# Patient Record
Sex: FEMALE | Race: BLACK OR AFRICAN AMERICAN | NOT HISPANIC OR LATINO | Employment: STUDENT | URBAN - METROPOLITAN AREA
[De-identification: names, ages, dates, MRNs, and addresses within clinical notes are randomized per-mention and may not be internally consistent; named-entity substitution may affect disease eponyms.]

---

## 2017-07-19 ENCOUNTER — HOSPITAL ENCOUNTER (EMERGENCY)
Facility: CLINIC | Age: 32
Discharge: HOME | End: 2017-07-19
Attending: EMERGENCY MEDICINE | Admitting: EMERGENCY MEDICINE
Payer: COMMERCIAL

## 2017-07-19 VITALS
BODY MASS INDEX: 34.89 KG/M2 | HEIGHT: 62 IN | OXYGEN SATURATION: 99 % | TEMPERATURE: 98.2 F | WEIGHT: 189.6 LBS | DIASTOLIC BLOOD PRESSURE: 84 MMHG | HEART RATE: 80 BPM | RESPIRATION RATE: 18 BRPM | SYSTOLIC BLOOD PRESSURE: 123 MMHG

## 2017-07-19 DIAGNOSIS — E86.0 DEHYDRATION: ICD-10-CM

## 2017-07-19 DIAGNOSIS — M54.50 ACUTE BILATERAL LOW BACK PAIN WITHOUT SCIATICA: ICD-10-CM

## 2017-07-19 LAB
ALBUMIN SERPL-MCNC: 3.8 G/DL (ref 3.4–5)
ALBUMIN UR-MCNC: NEGATIVE MG/DL
ALP SERPL-CCNC: 65 U/L (ref 40–150)
ALT SERPL W P-5'-P-CCNC: 21 U/L (ref 0–50)
ANION GAP SERPL CALCULATED.3IONS-SCNC: 9 MMOL/L (ref 3–14)
APPEARANCE UR: ABNORMAL
AST SERPL W P-5'-P-CCNC: 22 U/L (ref 0–45)
BASOPHILS # BLD AUTO: 0 10E9/L (ref 0–0.2)
BASOPHILS NFR BLD AUTO: 0.3 %
BILIRUB SERPL-MCNC: 0.4 MG/DL (ref 0.2–1.3)
BILIRUB UR QL STRIP: NEGATIVE
BUN SERPL-MCNC: 14 MG/DL (ref 7–30)
CALCIUM SERPL-MCNC: 8.8 MG/DL (ref 8.5–10.1)
CHLORIDE SERPL-SCNC: 105 MMOL/L (ref 94–109)
CO2 SERPL-SCNC: 26 MMOL/L (ref 20–32)
COLOR UR AUTO: ABNORMAL
CREAT SERPL-MCNC: 0.67 MG/DL (ref 0.52–1.04)
DIFFERENTIAL METHOD BLD: NORMAL
EOSINOPHIL # BLD AUTO: 0.1 10E9/L (ref 0–0.7)
EOSINOPHIL NFR BLD AUTO: 0.9 %
ERYTHROCYTE [DISTWIDTH] IN BLOOD BY AUTOMATED COUNT: 12.6 % (ref 10–15)
GFR SERPL CREATININE-BSD FRML MDRD: NORMAL ML/MIN/1.7M2
GLUCOSE SERPL-MCNC: 79 MG/DL (ref 70–99)
GLUCOSE UR STRIP-MCNC: NEGATIVE MG/DL
HCT VFR BLD AUTO: 37 % (ref 35–47)
HGB BLD-MCNC: 12.2 G/DL (ref 11.7–15.7)
HGB UR QL STRIP: NEGATIVE
IMM GRANULOCYTES # BLD: 0 10E9/L (ref 0–0.4)
IMM GRANULOCYTES NFR BLD: 0.2 %
KETONES UR STRIP-MCNC: >150 MG/DL
LEUKOCYTE ESTERASE UR QL STRIP: NEGATIVE
LYMPHOCYTES # BLD AUTO: 3.1 10E9/L (ref 0.8–5.3)
LYMPHOCYTES NFR BLD AUTO: 34.6 %
MCH RBC QN AUTO: 29.8 PG (ref 26.5–33)
MCHC RBC AUTO-ENTMCNC: 33 G/DL (ref 31.5–36.5)
MCV RBC AUTO: 91 FL (ref 78–100)
MONOCYTES # BLD AUTO: 0.6 10E9/L (ref 0–1.3)
MONOCYTES NFR BLD AUTO: 6.2 %
MUCOUS THREADS #/AREA URNS LPF: PRESENT /LPF
NEUTROPHILS # BLD AUTO: 5.1 10E9/L (ref 1.6–8.3)
NEUTROPHILS NFR BLD AUTO: 57.8 %
NITRATE UR QL: NEGATIVE
NRBC # BLD AUTO: 0 10*3/UL
NRBC BLD AUTO-RTO: 0 /100
PH UR STRIP: 6 PH (ref 5–7)
PLATELET # BLD AUTO: 259 10E9/L (ref 150–450)
POTASSIUM SERPL-SCNC: 3.7 MMOL/L (ref 3.4–5.3)
PROT SERPL-MCNC: 7.4 G/DL (ref 6.8–8.8)
RBC # BLD AUTO: 4.09 10E12/L (ref 3.8–5.2)
RBC #/AREA URNS AUTO: 0 /HPF (ref 0–2)
SODIUM SERPL-SCNC: 140 MMOL/L (ref 133–144)
SP GR UR STRIP: 1.02 (ref 1–1.03)
SQUAMOUS #/AREA URNS AUTO: 9 /HPF (ref 0–1)
URN SPEC COLLECT METH UR: ABNORMAL
UROBILINOGEN UR STRIP-MCNC: NORMAL MG/DL (ref 0–2)
WBC # BLD AUTO: 8.8 10E9/L (ref 4–11)
WBC #/AREA URNS AUTO: 2 /HPF (ref 0–2)

## 2017-07-19 PROCEDURE — 81001 URINALYSIS AUTO W/SCOPE: CPT | Performed by: EMERGENCY MEDICINE

## 2017-07-19 PROCEDURE — 25000128 H RX IP 250 OP 636: Performed by: EMERGENCY MEDICINE

## 2017-07-19 PROCEDURE — 87086 URINE CULTURE/COLONY COUNT: CPT | Performed by: EMERGENCY MEDICINE

## 2017-07-19 PROCEDURE — 96374 THER/PROPH/DIAG INJ IV PUSH: CPT

## 2017-07-19 PROCEDURE — 99284 EMERGENCY DEPT VISIT MOD MDM: CPT | Mod: 25

## 2017-07-19 PROCEDURE — 85025 COMPLETE CBC W/AUTO DIFF WBC: CPT | Performed by: EMERGENCY MEDICINE

## 2017-07-19 PROCEDURE — 99284 EMERGENCY DEPT VISIT MOD MDM: CPT | Mod: Z6 | Performed by: EMERGENCY MEDICINE

## 2017-07-19 PROCEDURE — 80053 COMPREHEN METABOLIC PANEL: CPT | Performed by: EMERGENCY MEDICINE

## 2017-07-19 RX ORDER — ACETAMINOPHEN AND CODEINE PHOSPHATE 300; 30 MG/1; MG/1
1-2 TABLET ORAL EVERY 6 HOURS PRN
Qty: 20 TABLET | Refills: 0 | Status: SHIPPED | OUTPATIENT
Start: 2017-07-19

## 2017-07-19 RX ORDER — KETOROLAC TROMETHAMINE 30 MG/ML
30 INJECTION, SOLUTION INTRAMUSCULAR; INTRAVENOUS ONCE
Status: COMPLETED | OUTPATIENT
Start: 2017-07-19 | End: 2017-07-19

## 2017-07-19 RX ADMIN — KETOROLAC TROMETHAMINE 30 MG: 30 INJECTION, SOLUTION INTRAMUSCULAR at 19:50

## 2017-07-19 ASSESSMENT — ENCOUNTER SYMPTOMS
ABDOMINAL PAIN: 0
DYSURIA: 0
FREQUENCY: 1
BACK PAIN: 1

## 2017-07-19 NOTE — ED AVS SNAPSHOT
[facility], Emergency Department    [address] [facility] [address]    Phone:  [phone]                                       Keira Montoya   MRN: [id number]    Department:  [facility], Emergency Department   Date of Visit:  7/19/2017           Patient Information     Date Of Birth          1985        Your diagnoses for this visit were:     Acute bilateral low back pain without sciatica     Dehydration        You were seen by Mariam Webster MD.        Discharge Instructions       Please drink plenty of liquids-water and beverages with electrolytes.   Take the tylenol #3 as needed for pain.   Follow up with your primary care doctor for further care.     Discharge References/Attachments     DEHYDRATION (ENGLISH)      24 Hour Appointment Hotline       To make an appointment at any [facility] clinic, call [phone]). If you don't have a family doctor or clinic, we will help you find one. [facility] clinics are conveniently located to serve the needs of you and your family.             Review of your medicines      START taking        Dose / Directions Last dose taken    acetaminophen-codeine 300-30 MG per tablet   Commonly known as:  TYLENOL/codeine #3   Dose:  1-2 tablet   Quantity:  20 tablet        Take 1-2 tablets by mouth every 6 hours as needed for pain   Refills:  0                Prescriptions were sent or printed at these locations (1 Prescription)                   Other Prescriptions                Printed at Department/Unit printer (1 of 1)         acetaminophen-codeine (TYLENOL/CODEINE #3) 300-30 MG per tablet                Procedures and tests performed during your visit     CBC with platelets differential    Comprehensive metabolic panel    UA with Microscopic    Urine Culture      Orders Needing Specimen Collection     None      Pending Results     Date and Time Order Name Status Description    7/19/2017 1929 Urine Culture Preliminary            "  Pending Culture Results     Date and Time Order Name Status Description    2017 1929 Urine Culture Preliminary             Pending Results Instructions     If you had any lab results that were not finalized at the time of your Discharge, you can call the ED Lab Result RN at [phone]. You will be contacted by this team for any positive Lab results or changes in treatment. The nurses are available 7 days a week from 10A to 6:30P.  You can leave a message 24 hours per day and they will return your call.        Thank you for choosing [facility]       Thank you for choosing [facility] for your care. Our goal is always to provide you with excellent care. Hearing back from our patients is one way we can continue to improve our services. Please take a few minutes to complete the written survey that you may receive in the mail after you visit with us. Thank you!        [employer] Information     [employer] lets you send messages to your doctor, view your test results, renew your prescriptions, schedule appointments and more. To sign up, go to [email or web address] . Click on \"Log in\" on the left side of the screen, which will take you to the Welcome page. Then click on \"Sign up Now\" on the right side of the page.     You will be asked to enter the access code listed below, as well as some personal information. Please follow the directions to create your username and password.     Your access code is: [email or web address]  Expires: 10/17/2017  9:29 PM     Your access code will  in 90 days. If you need help or a new code, please call your [facility] clinic or [phone].        Care EveryWhere ID     This is your Care EveryWhere ID. This could be used by other organizations to access your [facility] medical records  [id number]        Equal Access to Services     QIANA KHAN : Afshan Arreguin, dalila brown, katelyn monterroso. So kymberly " [phone].    ATENCIÓN: Si habla español, tiene a phillips disposición servicios gratuitos de asistencia lingüística. Llame al [phone].    We comply with applicable federal civil rights laws and [place] laws. We do not discriminate on the basis of race, color, national origin, age, disability sex, sexual orientation or gender identity.            After Visit Summary       This is your record. Keep this with you and show to your community pharmacist(s) and doctor(s) at your next visit.

## 2017-07-19 NOTE — ED NOTES
Pt presents ambulatory to triage from home with spouse. Pt states began having lower back pain a few weeks ago along with pain with urination. Pt seen by PCP today for issue. Has pelvic and negative urine test for UTI, sent for culture. Pt concerned since pain is getting worse. Denies fevers.

## 2017-07-19 NOTE — ED AVS SNAPSHOT
[facility] [place], Emergency Department    [address] [place] [address]    Phone:  [phone]                                       Keira Montoya   MRN: [id number]    Department:  [facility], Emergency Department   Date of Visit:  7/19/2017           After Visit Summary Signature Page     I have received my discharge instructions, and my questions have been answered. I have discussed any challenges I see with this plan with the nurse or doctor.    ..........................................................................................................................................  Patient/Patient Representative Signature      ..........................................................................................................................................  Patient Representative Print Name and Relationship to Patient    ..................................................               ................................................  Date                                            Time    ..........................................................................................................................................  Reviewed by Signature/Title    ...................................................              ..............................................  Date                                                            Time

## 2017-07-20 LAB
BACTERIA SPEC CULT: NORMAL
Lab: NORMAL
MICRO REPORT STATUS: NORMAL
SPECIMEN SOURCE: NORMAL

## 2017-07-20 NOTE — ED PROVIDER NOTES
"  History     Chief Complaint   Patient presents with     Back Pain     HPI  Keira Montoya is a 31 year old female who presents to the ED with back pain. Patient she has had ongoing low back pain for the past month. She states she did have dysuria, urinary frequency, and vaginal itching, but the dysuria and itching have resolved. She states her low back pain is made worse with standing. She reports she was seen by her PCP today and had a negative UA for UTI and her PCP was concerned for high amounts of protein in her urine. She states she has been on a ketogenic diet for the past 4 months. She otherwise currently denies possibility of pregnancy, vaginal discharge, or  abdominal pain.    PAST MEDICAL HISTORY  History reviewed. No pertinent past medical history.  PAST SURGICAL HISTORY  History reviewed. No pertinent surgical history.  FAMILY HISTORY  No family history on file.  SOCIAL HISTORY  Social History   Substance Use Topics     Smoking status: Never Smoker     Smokeless tobacco: Never Used     Alcohol use No     MEDICATIONS  Current Facility-Administered Medications   Medication     ketorolac (TORADOL) injection 30 mg     No current outpatient prescriptions on file.     ALLERGIES  No Known Allergies    I have reviewed the Medications, Allergies, Past Medical and Surgical History, and Social History in the Epic system.    Review of Systems   Gastrointestinal: Negative for abdominal pain.   Genitourinary: Positive for frequency. Negative for dysuria and vaginal discharge.   Musculoskeletal: Positive for back pain.   All other systems reviewed and are negative.      Physical Exam   BP: 122/83  Pulse: 75  Temp: 98.2  F (36.8  C)  Resp: 16  Height: 157.5 cm (5' 2\")  Weight: 86 kg (189 lb 9.5 oz)  SpO2: 99 %  Physical Exam   Constitutional: She is oriented to person, place, and time. She appears well-developed and well-nourished.   HENT:   Head: Normocephalic and atraumatic.   Neck: Normal range of motion. "   Cardiovascular: Normal rate, regular rhythm and normal heart sounds.    Pulmonary/Chest: Effort normal and breath sounds normal. No respiratory distress.   Abdominal: Soft. She exhibits no distension. There is no tenderness. There is no rebound.   Musculoskeletal: She exhibits tenderness (mild mid lower back pain, no flank pain; negative straight leg raise; able to fully flex and extend back; pain worse  with standing straight up).   Neurological: She is alert and oriented to person, place, and time.   Skin: Skin is warm and dry.   Psychiatric: She has a normal mood and affect. Her behavior is normal. Thought content normal.       ED Course     ED Course     Procedures                Critical Care time:  none         Results for orders placed or performed during the hospital encounter of 07/19/17   CBC with platelets differential   Result Value Ref Range    WBC 8.8 4.0 - 11.0 10e9/L    RBC Count 4.09 3.8 - 5.2 10e12/L    Hemoglobin 12.2 11.7 - 15.7 g/dL    Hematocrit 37.0 35.0 - 47.0 %    MCV 91 78 - 100 fl    MCH 29.8 26.5 - 33.0 pg    MCHC 33.0 31.5 - 36.5 g/dL    RDW 12.6 10.0 - 15.0 %    Platelet Count 259 150 - 450 10e9/L    Diff Method Automated Method     % Neutrophils 57.8 %    % Lymphocytes 34.6 %    % Monocytes 6.2 %    % Eosinophils 0.9 %    % Basophils 0.3 %    % Immature Granulocytes 0.2 %    Nucleated RBCs 0 0 /100    Absolute Neutrophil 5.1 1.6 - 8.3 10e9/L    Absolute Lymphocytes 3.1 0.8 - 5.3 10e9/L    Absolute Monocytes 0.6 0.0 - 1.3 10e9/L    Absolute Eosinophils 0.1 0.0 - 0.7 10e9/L    Absolute Basophils 0.0 0.0 - 0.2 10e9/L    Abs Immature Granulocytes 0.0 0 - 0.4 10e9/L    Absolute Nucleated RBC 0.0    Comprehensive metabolic panel   Result Value Ref Range    Sodium 140 133 - 144 mmol/L    Potassium 3.7 3.4 - 5.3 mmol/L    Chloride 105 94 - 109 mmol/L    Carbon Dioxide 26 20 - 32 mmol/L    Anion Gap 9 3 - 14 mmol/L    Glucose 79 70 - 99 mg/dL    Urea Nitrogen 14 7 - 30 mg/dL    Creatinine  0.67 0.52 - 1.04 mg/dL    GFR Estimate >90  Non  GFR Calc   >60 mL/min/1.7m2    GFR Estimate If Black >90   GFR Calc   >60 mL/min/1.7m2    Calcium 8.8 8.5 - 10.1 mg/dL    Bilirubin Total 0.4 0.2 - 1.3 mg/dL    Albumin 3.8 3.4 - 5.0 g/dL    Protein Total 7.4 6.8 - 8.8 g/dL    Alkaline Phosphatase 65 40 - 150 U/L    ALT 21 0 - 50 U/L    AST 22 0 - 45 U/L   UA with Microscopic   Result Value Ref Range    Color Urine Light Yellow     Appearance Urine Slightly Cloudy     Glucose Urine Negative NEG mg/dL    Bilirubin Urine Negative NEG    Ketones Urine >150 (A) NEG mg/dL    Specific Gravity Urine 1.022 1.003 - 1.035    Blood Urine Negative NEG    pH Urine 6.0 5.0 - 7.0 pH    Protein Albumin Urine Negative NEG mg/dL    Urobilinogen mg/dL Normal 0.0 - 2.0 mg/dL    Nitrite Urine Negative NEG    Leukocyte Esterase Urine Negative NEG    Source Clean catch urine     WBC Urine 2 0 - 2 /HPF    RBC Urine 0 0 - 2 /HPF    Squamous Epithelial /HPF Urine 9 (H) 0 - 1 /HPF    Mucous Urine Present (A) NEG /LPF   Urine Culture   Result Value Ref Range    Specimen Description Unspecified Urine     Special Requests Specimen received in preservative     Culture Micro Pending     Micro Report Status Pending      Medications   ketorolac (TORADOL) injection 30 mg (30 mg Intravenous Given 7/19/17 1950)              Labs Ordered and Resulted from Time of ED Arrival Up to the Time of Departure from the ED - No data to display         Assessments & Plan (with Medical Decision Making) patient presents to the ER to to lower back pain.  Patient's pain is not located at the location of her kidneys.  Obtained a UA that shows positive urine ketones.  Slightly sleepy from patient's high protein no carbohydrate diet.  Patient's blood tests are all normal.  She received 1 dose of Toradol and her back pain has resolved.  Feel a patient's back pain is likely musculoskeletal.  Recommended she drink large amount of liquids and  eat small amount of carbs to prevent ketouria. Pt agrees. Stable for discharge.        I have reviewed the nursing notes.    I have reviewed the findings, diagnosis, plan and need for follow up with the patient.    New Prescriptions    No medications on file       Final diagnoses:   Acute bilateral low back pain without sciatica   Dehydration      ICurry, am serving as a trained medical scribe to document services personally performed by Mariam Webster MD, based on the provider's statements to me.      IMariam MD, was physically present and have reviewed and verified the accuracy of this note documented by Curry Salazar.     7/19/2017   [facility], [place], EMERGENCY DEPARTMENT     Mariam Webster MD  07/20/17 0114

## 2017-07-20 NOTE — DISCHARGE INSTRUCTIONS
Please drink plenty of liquids-water and beverages with electrolytes.   Take the tylenol #3 as needed for pain.   Follow up with your primary care doctor for further care.

## 2017-12-05 ENCOUNTER — APPOINTMENT (OUTPATIENT)
Dept: CT IMAGING | Facility: CLINIC | Age: 32
End: 2017-12-05
Attending: EMERGENCY MEDICINE
Payer: COMMERCIAL

## 2017-12-05 ENCOUNTER — HOSPITAL ENCOUNTER (EMERGENCY)
Facility: CLINIC | Age: 32
Discharge: HOME | End: 2017-12-05
Attending: EMERGENCY MEDICINE | Admitting: EMERGENCY MEDICINE
Payer: COMMERCIAL

## 2017-12-05 ENCOUNTER — APPOINTMENT (OUTPATIENT)
Dept: ULTRASOUND IMAGING | Facility: CLINIC | Age: 32
End: 2017-12-05
Attending: EMERGENCY MEDICINE
Payer: COMMERCIAL

## 2017-12-05 VITALS
TEMPERATURE: 97.1 F | OXYGEN SATURATION: 100 % | HEART RATE: 55 BPM | WEIGHT: 190.8 LBS | DIASTOLIC BLOOD PRESSURE: 76 MMHG | SYSTOLIC BLOOD PRESSURE: 112 MMHG | RESPIRATION RATE: 16 BRPM | BODY MASS INDEX: 34.9 KG/M2

## 2017-12-05 DIAGNOSIS — R10.9 FLANK PAIN: ICD-10-CM

## 2017-12-05 LAB
ALBUMIN UR-MCNC: NEGATIVE MG/DL
ANION GAP SERPL CALCULATED.3IONS-SCNC: 4 MMOL/L (ref 3–14)
APPEARANCE UR: CLEAR
BASOPHILS # BLD AUTO: 0 10E9/L (ref 0–0.2)
BASOPHILS NFR BLD AUTO: 0.3 %
BILIRUB UR QL STRIP: NEGATIVE
BUN SERPL-MCNC: 11 MG/DL (ref 7–30)
CALCIUM SERPL-MCNC: 8.8 MG/DL (ref 8.5–10.1)
CHLORIDE SERPL-SCNC: 107 MMOL/L (ref 94–109)
CO2 SERPL-SCNC: 28 MMOL/L (ref 20–32)
COLOR UR AUTO: NORMAL
CREAT SERPL-MCNC: 0.64 MG/DL (ref 0.52–1.04)
DIFFERENTIAL METHOD BLD: NORMAL
EOSINOPHIL # BLD AUTO: 0.1 10E9/L (ref 0–0.7)
EOSINOPHIL NFR BLD AUTO: 1.3 %
ERYTHROCYTE [DISTWIDTH] IN BLOOD BY AUTOMATED COUNT: 12.6 % (ref 10–15)
GFR SERPL CREATININE-BSD FRML MDRD: >90 ML/MIN/1.7M2
GLUCOSE SERPL-MCNC: 83 MG/DL (ref 70–99)
GLUCOSE UR STRIP-MCNC: NEGATIVE MG/DL
HCG UR QL: NEGATIVE
HCT VFR BLD AUTO: 35.7 % (ref 35–47)
HGB BLD-MCNC: 11.8 G/DL (ref 11.7–15.7)
HGB UR QL STRIP: NEGATIVE
IMM GRANULOCYTES # BLD: 0 10E9/L (ref 0–0.4)
IMM GRANULOCYTES NFR BLD: 0.3 %
INTERNAL QC OK POCT: YES
KETONES UR STRIP-MCNC: NEGATIVE MG/DL
LEUKOCYTE ESTERASE UR QL STRIP: NEGATIVE
LYMPHOCYTES # BLD AUTO: 2.6 10E9/L (ref 0.8–5.3)
LYMPHOCYTES NFR BLD AUTO: 32.7 %
MCH RBC QN AUTO: 30.8 PG (ref 26.5–33)
MCHC RBC AUTO-ENTMCNC: 33.1 G/DL (ref 31.5–36.5)
MCV RBC AUTO: 93 FL (ref 78–100)
MONOCYTES # BLD AUTO: 0.5 10E9/L (ref 0–1.3)
MONOCYTES NFR BLD AUTO: 6.8 %
NEUTROPHILS # BLD AUTO: 4.7 10E9/L (ref 1.6–8.3)
NEUTROPHILS NFR BLD AUTO: 58.6 %
NITRATE UR QL: NEGATIVE
NRBC # BLD AUTO: 0 10*3/UL
NRBC BLD AUTO-RTO: 0 /100
PH UR STRIP: 7 PH (ref 5–7)
PLATELET # BLD AUTO: 273 10E9/L (ref 150–450)
POTASSIUM SERPL-SCNC: 3.6 MMOL/L (ref 3.4–5.3)
RBC # BLD AUTO: 3.83 10E12/L (ref 3.8–5.2)
RBC #/AREA URNS AUTO: 0 /HPF (ref 0–2)
SODIUM SERPL-SCNC: 139 MMOL/L (ref 133–144)
SOURCE: NORMAL
SP GR UR STRIP: 1 (ref 1–1.03)
SQUAMOUS #/AREA URNS AUTO: <1 /HPF (ref 0–1)
UROBILINOGEN UR STRIP-MCNC: NORMAL MG/DL (ref 0–2)
WBC # BLD AUTO: 8 10E9/L (ref 4–11)
WBC #/AREA URNS AUTO: <1 /HPF (ref 0–2)

## 2017-12-05 PROCEDURE — 25000125 ZZHC RX 250: Performed by: EMERGENCY MEDICINE

## 2017-12-05 PROCEDURE — 99284 EMERGENCY DEPT VISIT MOD MDM: CPT | Mod: 25 | Performed by: EMERGENCY MEDICINE

## 2017-12-05 PROCEDURE — 25000128 H RX IP 250 OP 636: Performed by: EMERGENCY MEDICINE

## 2017-12-05 PROCEDURE — 74177 CT ABD & PELVIS W/CONTRAST: CPT

## 2017-12-05 PROCEDURE — 96374 THER/PROPH/DIAG INJ IV PUSH: CPT | Mod: 59 | Performed by: EMERGENCY MEDICINE

## 2017-12-05 PROCEDURE — 99284 EMERGENCY DEPT VISIT MOD MDM: CPT | Mod: Z6 | Performed by: EMERGENCY MEDICINE

## 2017-12-05 PROCEDURE — 85025 COMPLETE CBC W/AUTO DIFF WBC: CPT | Performed by: EMERGENCY MEDICINE

## 2017-12-05 PROCEDURE — 93976 VASCULAR STUDY: CPT | Mod: XS

## 2017-12-05 PROCEDURE — 80048 BASIC METABOLIC PNL TOTAL CA: CPT | Performed by: EMERGENCY MEDICINE

## 2017-12-05 PROCEDURE — 81025 URINE PREGNANCY TEST: CPT | Performed by: FAMILY MEDICINE

## 2017-12-05 PROCEDURE — 81001 URINALYSIS AUTO W/SCOPE: CPT | Performed by: FAMILY MEDICINE

## 2017-12-05 RX ORDER — NAPROXEN 500 MG/1
500 TABLET ORAL 2 TIMES DAILY WITH MEALS
Qty: 16 TABLET | Refills: 0 | Status: SHIPPED | OUTPATIENT
Start: 2017-12-05 | End: 2017-12-13

## 2017-12-05 RX ORDER — ASPIRIN 81 MG
100 TABLET, DELAYED RELEASE (ENTERIC COATED) ORAL DAILY
Qty: 60 TABLET | Refills: 1 | Status: SHIPPED | OUTPATIENT
Start: 2017-12-05

## 2017-12-05 RX ORDER — IOPAMIDOL 755 MG/ML
100 INJECTION, SOLUTION INTRAVASCULAR ONCE
Status: COMPLETED | OUTPATIENT
Start: 2017-12-05 | End: 2017-12-05

## 2017-12-05 RX ORDER — KETOROLAC TROMETHAMINE 30 MG/ML
30 INJECTION, SOLUTION INTRAMUSCULAR; INTRAVENOUS ONCE
Status: COMPLETED | OUTPATIENT
Start: 2017-12-05 | End: 2017-12-05

## 2017-12-05 RX ADMIN — IOPAMIDOL 87 ML: 755 INJECTION, SOLUTION INTRAVENOUS at 19:22

## 2017-12-05 RX ADMIN — KETOROLAC TROMETHAMINE 30 MG: 30 INJECTION, SOLUTION INTRAMUSCULAR at 20:04

## 2017-12-05 RX ADMIN — SODIUM CHLORIDE 63 ML: 9 INJECTION, SOLUTION INTRAVENOUS at 19:22

## 2017-12-05 ASSESSMENT — ENCOUNTER SYMPTOMS
CHILLS: 1
FEVER: 0
DIARRHEA: 0
ABDOMINAL PAIN: 1
DYSURIA: 1
CONSTIPATION: 0
FLANK PAIN: 1

## 2017-12-05 NOTE — ED AVS SNAPSHOT
[facility], Emergency Department    [address]    [place] [address]    Phone:  [phone]    Fax:  [phone]                                       Keira Montoya   MRN: [id number]    Department:  [facility], Emergency Department   Date of Visit:  12/5/2017           Patient Information     Date Of Birth          1985        Your diagnoses for this visit were:     Flank pain        You were seen by Daniel Laird MD.        Discharge Instructions       Please make an appointment to follow up with Your Primary Care Provider as soon as possible if you have any concerns.  Results for orders placed or performed during the hospital encounter of 12/05/17   CT Abdomen Pelvis w Contrast    Narrative    CT ABDOMEN AND PELVIS WITH CONTRAST 12/5/2017 7:27 PM     HISTORY: RLQ pain.     COMPARISON: None.    TECHNIQUE: Volumetric helical acquisition of CT images from the lung  bases through the symphysis pubis after the administration of  intravenous contrast. Radiation dose for this scan was reduced using  automated exposure control, adjustment of the mA and/or kV according  to patient size, or iterative reconstruction technique.    FINDINGS: The liver, bilateral kidneys and adrenal glands, pancreas,  and spleen demonstrate no worrisome focal lesion. Visualized portions  of the appendix unremarkable. There is a small amount of free fluid  which is nonspecific. Small cyst or follicle probably in the right  ovary although there is a somewhat tubular fluid-filled structure in  this region, its unclear if this is bowel or fallopian tube. Consider  ultrasound for further evaluation. No hydronephrosis. Unremarkable  gallbladder. No free air in the abdomen. Bone windows reveal no  destructive lesions. There are no abdominal or pelvic lymph nodes that  are abnormal by size criteria. The visualized lung bases are  unremarkable. There are no dilated loops of small bowel or colon.      Impression     IMPRESSION: Some free fluid and a possible fluid-filled tubular  structure in the right adnexal region, this could be adjacent bowel.  Consider ultrasound for further evaluation of tubo-ovarian abscess.  Small probable cyst or follicle in the right ovary. Otherwise no acute  process demonstrated in the abdomen and pelvis.   US Pelvic Complete w Transvaginal & Abd/Pel Duplex Limited    Narrative    ULTRASOUND PELVIS DOPPLER WITH TRANSVAGINAL IMAGING  12/5/2017 9:23 PM      HISTORY: RLQ pain with ovarian concerns for possible torsion.     COMPARISON: None.    TECHNIQUE: Transvaginal images were performed to better evaluate the  patient's uterus, ovaries and endometrial stripe. Grayscale, color  Doppler, and Doppler spectral waveform analysis performed.    FINDINGS:  No fibroids are evident. The uterus is normal. Endometrial  stripe measures 13 mm and is normal for patient's age and menstrual  status. The right ovary is normal. The left ovary demonstrates a tiny  probable calcification of no clinical significance. Doppler spectral  waveform analysis demonstrates blood flow to both ovaries. No adnexal  masses are present. No free pelvic fluid is present.      Impression    IMPRESSION: No torsion demonstrated.   Routine UA with microscopic   Result Value Ref Range    Color Urine Light Yellow     Appearance Urine Clear     Glucose Urine Negative NEG^Negative mg/dL    Bilirubin Urine Negative NEG^Negative    Ketones Urine Negative NEG^Negative mg/dL    Specific Gravity Urine 1.005 1.003 - 1.035    Blood Urine Negative NEG^Negative    pH Urine 7.0 5.0 - 7.0 pH    Protein Albumin Urine Negative NEG^Negative mg/dL    Urobilinogen mg/dL Normal 0.0 - 2.0 mg/dL    Nitrite Urine Negative NEG^Negative    Leukocyte Esterase Urine Negative NEG^Negative    Source Unspecified Urine     WBC Urine <1 0 - 2 /HPF    RBC Urine 0 0 - 2 /HPF    Squamous Epithelial /HPF Urine <1 0 - 1 /HPF   Basic metabolic panel   Result Value Ref Range     Sodium 139 133 - 144 mmol/L    Potassium 3.6 3.4 - 5.3 mmol/L    Chloride 107 94 - 109 mmol/L    Carbon Dioxide 28 20 - 32 mmol/L    Anion Gap 4 3 - 14 mmol/L    Glucose 83 70 - 99 mg/dL    Urea Nitrogen 11 7 - 30 mg/dL    Creatinine 0.64 0.52 - 1.04 mg/dL    GFR Estimate >90 >60 mL/min/1.7m2    GFR Estimate If Black >90 >60 mL/min/1.7m2    Calcium 8.8 8.5 - 10.1 mg/dL   CBC with platelets differential   Result Value Ref Range    WBC 8.0 4.0 - 11.0 10e9/L    RBC Count 3.83 3.8 - 5.2 10e12/L    Hemoglobin 11.8 11.7 - 15.7 g/dL    Hematocrit 35.7 35.0 - 47.0 %    MCV 93 78 - 100 fl    MCH 30.8 26.5 - 33.0 pg    MCHC 33.1 31.5 - 36.5 g/dL    RDW 12.6 10.0 - 15.0 %    Platelet Count 273 150 - 450 10e9/L    Diff Method Automated Method     % Neutrophils 58.6 %    % Lymphocytes 32.7 %    % Monocytes 6.8 %    % Eosinophils 1.3 %    % Basophils 0.3 %    % Immature Granulocytes 0.3 %    Nucleated RBCs 0 0 /100    Absolute Neutrophil 4.7 1.6 - 8.3 10e9/L    Absolute Lymphocytes 2.6 0.8 - 5.3 10e9/L    Absolute Monocytes 0.5 0.0 - 1.3 10e9/L    Absolute Eosinophils 0.1 0.0 - 0.7 10e9/L    Absolute Basophils 0.0 0.0 - 0.2 10e9/L    Abs Immature Granulocytes 0.0 0 - 0.4 10e9/L    Absolute Nucleated RBC 0.0    hCG qual urine POCT   Result Value Ref Range    HCG Qual Urine Negative neg    Internal QC OK Yes            24 Hour Appointment Hotline       To make an appointment at any [facility] clinic, call [phone] ([phone]). If you don't have a family doctor or clinic, we will help you find one. [facility] clinics are conveniently located to serve the needs of you and your family.             Review of your medicines      START taking        Dose / Directions Last dose taken    docusate sodium 100 MG tablet   Commonly known as:  COLACE   Dose:  100 mg   Quantity:  60 tablet        Take 100 mg by mouth daily   Refills:  1        naproxen 500 MG tablet   Commonly known as:  NAPROSYN   Dose:  500 mg   Quantity:  16 tablet         Take 1 tablet (500 mg) by mouth 2 times daily (with meals) for 8 days   Refills:  0          Our records show that you are taking the medicines listed below. If these are incorrect, please call your family doctor or clinic.        Dose / Directions Last dose taken    acetaminophen-codeine 300-30 MG per tablet   Commonly known as:  TYLENOL WITH CODEINE #3   Dose:  1-2 tablet   Quantity:  20 tablet        Take 1-2 tablets by mouth every 6 hours as needed for pain   Refills:  0                Prescriptions were sent or printed at these locations (2 Prescriptions)                   Other Prescriptions                Printed at Department/Unit printer (2 of 2)         naproxen (NAPROSYN) 500 MG tablet               docusate sodium (COLACE) 100 MG tablet                Procedures and tests performed during your visit     Basic metabolic panel    CBC with platelets differential    CT Abdomen Pelvis w Contrast    Routine UA with microscopic    US Pelvic Complete w Transvaginal & Abd/Pel Duplex Limited    hCG qual urine POCT      Orders Needing Specimen Collection     None      Pending Results     Date and Time Order Name Status Description    12/5/2017 2037 US Pelvic Complete w Transvaginal & Abd/Pel Duplex Limited Preliminary     12/5/2017 1807 CT Abdomen Pelvis w Contrast Preliminary             Pending Culture Results     No orders found from 12/3/2017 to 12/6/2017.            Pending Results Instructions     If you had any lab results that were not finalized at the time of your Discharge, you can call the ED Lab Result RN at [phone]. You will be contacted by this team for any positive Lab results or changes in treatment. The nurses are available 7 days a week from 10A to 6:30P.  You can leave a message 24 hours per day and they will return your call.        Thank you for choosing [facility]       Thank you for choosing [facility] for your care. Our goal is always to provide you with excellent care. Hearing back from  "our patients is one way we can continue to improve our services. Please take a few minutes to complete the written survey that you may receive in the mail after you visit with us. Thank you!        [employer] Information     [employer] lets you send messages to your doctor, view your test results, renew your prescriptions, schedule appointments and more. To sign up, go to [email or web address] . Click on \"Log in\" on the left side of the screen, which will take you to the Welcome page. Then click on \"Sign up Now\" on the right side of the page.     You will be asked to enter the access code listed below, as well as some personal information. Please follow the directions to create your username and password.     Your access code is: [email or web address]  Expires: 3/5/2018  9:51 PM     Your access code will  in 90 days. If you need help or a new code, please call your [facility] clinic or [phone].        Care EveryWhere ID     This is your Care EveryWhere ID. This could be used by other organizations to access your [facility] medical records  [id number]        Equal Access to Services     QIANA KHAN : Hadkelsey Arreguin, dalila brown, deric mcguire, katelyn lay . So [facility] [phone].    ATENCIÓN: Si habla español, tiene a phillips disposición servicios gratuitos de asistencia lingüística. Chantel al [phone].    We comply with applicable federal civil rights laws and [place] laws. We do not discriminate on the basis of race, color, national origin, age, disability, sex, sexual orientation, or gender identity.            After Visit Summary       This is your record. Keep this with you and show to your community pharmacist(s) and doctor(s) at your next visit.                  "

## 2017-12-05 NOTE — ED PROVIDER NOTES
History     Chief Complaint   Patient presents with     Flank Pain     RLQ abdominal pain x 3 days; constant nause denies vomiting. Flank pain started today PM. + burning sensation     HPI   Keira Montoya is a 32 year old female who presents with abdominal pain. The patient reports that she has had right lower quadrant abdominal pain over the past 3 days, and today the pain started radiating around to her back/right flank. She also complains of dysuria. Patient reports that she has a history of UTIs in the past with similar symptoms. She reports that her last day of her last period was on Friday, 4 days ago. Her most recent UTI was earlier this year. She denies fever, though complains of chills. Her bowel movements have been normal. The patient reports a history of  section, otherwise denies abdominal surgical history.      History reviewed. No pertinent past medical history.    Past Surgical History:   Procedure Laterality Date     GYN SURGERY         No family history on file.    Social History   Substance Use Topics     Smoking status: Never Smoker     Smokeless tobacco: Never Used     Alcohol use No     No current facility-administered medications for this encounter.      Current Outpatient Prescriptions   Medication     naproxen (NAPROSYN) 500 MG tablet     docusate sodium (COLACE) 100 MG tablet     acetaminophen-codeine (TYLENOL/CODEINE #3) 300-30 MG per tablet      No Known Allergies     I have reviewed the Medications, Allergies, Past Medical and Surgical History, and Social History in the Epic system.    Review of Systems   Constitutional: Positive for chills. Negative for fever.   Gastrointestinal: Positive for abdominal pain (RLQ). Negative for constipation and diarrhea.   Genitourinary: Positive for dysuria and flank pain (right).   All other systems reviewed and are negative.      Physical Exam   BP: 117/70  Pulse: 55  Heart Rate: 72  Temp: 97.1  F (36.2  C)  Resp: 16  Weight: 86.5 kg (190 lb  12.8 oz)  SpO2: 98 %      Physical Exam   Constitutional: She is oriented to person, place, and time. She appears well-developed and well-nourished. No distress.   HENT:   Head: Normocephalic and atraumatic.   Eyes: No scleral icterus.   Neck: Normal range of motion. Neck supple.   Abdominal: Normal appearance. There is tenderness in the right lower quadrant.   Neurological: She is alert and oriented to person, place, and time.   Skin: Skin is warm and dry. No rash noted. She is not diaphoretic. No erythema. No pallor.       ED Course     ED Course     Procedures     5:58 PM  The patient was seen and examined by Dr. Laird in Room 6.               Critical Care time:  none         Results for orders placed or performed during the hospital encounter of 12/05/17   CT Abdomen Pelvis w Contrast    Narrative    CT ABDOMEN AND PELVIS WITH CONTRAST 12/5/2017 7:27 PM     HISTORY: RLQ pain.     COMPARISON: None.    TECHNIQUE: Volumetric helical acquisition of CT images from the lung  bases through the symphysis pubis after the administration of  intravenous contrast. Radiation dose for this scan was reduced using  automated exposure control, adjustment of the mA and/or kV according  to patient size, or iterative reconstruction technique.    FINDINGS: The liver, bilateral kidneys and adrenal glands, pancreas,  and spleen demonstrate no worrisome focal lesion. Visualized portions  of the appendix unremarkable. There is a small amount of free fluid  which is nonspecific. Small cyst or follicle probably in the right  ovary although there is a somewhat tubular fluid-filled structure in  this region, its unclear if this is bowel or fallopian tube. Consider  ultrasound for further evaluation. No hydronephrosis. Unremarkable  gallbladder. No free air in the abdomen. Bone windows reveal no  destructive lesions. There are no abdominal or pelvic lymph nodes that  are abnormal by size criteria. The visualized lung bases  are  unremarkable. There are no dilated loops of small bowel or colon.      Impression    IMPRESSION: Some free fluid and a possible fluid-filled tubular  structure in the right adnexal region, this could be adjacent bowel.  Consider ultrasound for further evaluation of tubo-ovarian abscess.  Small probable cyst or follicle in the right ovary. Otherwise no acute  process demonstrated in the abdomen and pelvis.   US Pelvic Complete w Transvaginal & Abd/Pel Duplex Limited    Narrative    ULTRASOUND PELVIS DOPPLER WITH TRANSVAGINAL IMAGING  12/5/2017 9:23 PM      HISTORY: RLQ pain with ovarian concerns for possible torsion.     COMPARISON: None.    TECHNIQUE: Transvaginal images were performed to better evaluate the  patient's uterus, ovaries and endometrial stripe. Grayscale, color  Doppler, and Doppler spectral waveform analysis performed.    FINDINGS:  No fibroids are evident. The uterus is normal. Endometrial  stripe measures 13 mm and is normal for patient's age and menstrual  status. The right ovary is normal. The left ovary demonstrates a tiny  probable calcification of no clinical significance. Doppler spectral  waveform analysis demonstrates blood flow to both ovaries. No adnexal  masses are present. No free pelvic fluid is present.      Impression    IMPRESSION: No torsion demonstrated.   Routine UA with microscopic   Result Value Ref Range    Color Urine Light Yellow     Appearance Urine Clear     Glucose Urine Negative NEG^Negative mg/dL    Bilirubin Urine Negative NEG^Negative    Ketones Urine Negative NEG^Negative mg/dL    Specific Gravity Urine 1.005 1.003 - 1.035    Blood Urine Negative NEG^Negative    pH Urine 7.0 5.0 - 7.0 pH    Protein Albumin Urine Negative NEG^Negative mg/dL    Urobilinogen mg/dL Normal 0.0 - 2.0 mg/dL    Nitrite Urine Negative NEG^Negative    Leukocyte Esterase Urine Negative NEG^Negative    Source Unspecified Urine     WBC Urine <1 0 - 2 /HPF    RBC Urine 0 0 - 2 /HPF    Squamous  Epithelial /HPF Urine <1 0 - 1 /HPF   Basic metabolic panel   Result Value Ref Range    Sodium 139 133 - 144 mmol/L    Potassium 3.6 3.4 - 5.3 mmol/L    Chloride 107 94 - 109 mmol/L    Carbon Dioxide 28 20 - 32 mmol/L    Anion Gap 4 3 - 14 mmol/L    Glucose 83 70 - 99 mg/dL    Urea Nitrogen 11 7 - 30 mg/dL    Creatinine 0.64 0.52 - 1.04 mg/dL    GFR Estimate >90 >60 mL/min/1.7m2    GFR Estimate If Black >90 >60 mL/min/1.7m2    Calcium 8.8 8.5 - 10.1 mg/dL   CBC with platelets differential   Result Value Ref Range    WBC 8.0 4.0 - 11.0 10e9/L    RBC Count 3.83 3.8 - 5.2 10e12/L    Hemoglobin 11.8 11.7 - 15.7 g/dL    Hematocrit 35.7 35.0 - 47.0 %    MCV 93 78 - 100 fl    MCH 30.8 26.5 - 33.0 pg    MCHC 33.1 31.5 - 36.5 g/dL    RDW 12.6 10.0 - 15.0 %    Platelet Count 273 150 - 450 10e9/L    Diff Method Automated Method     % Neutrophils 58.6 %    % Lymphocytes 32.7 %    % Monocytes 6.8 %    % Eosinophils 1.3 %    % Basophils 0.3 %    % Immature Granulocytes 0.3 %    Nucleated RBCs 0 0 /100    Absolute Neutrophil 4.7 1.6 - 8.3 10e9/L    Absolute Lymphocytes 2.6 0.8 - 5.3 10e9/L    Absolute Monocytes 0.5 0.0 - 1.3 10e9/L    Absolute Eosinophils 0.1 0.0 - 0.7 10e9/L    Absolute Basophils 0.0 0.0 - 0.2 10e9/L    Abs Immature Granulocytes 0.0 0 - 0.4 10e9/L    Absolute Nucleated RBC 0.0    hCG qual urine POCT   Result Value Ref Range    HCG Qual Urine Negative neg    Internal QC OK Yes      Medications   iopamidol (ISOVUE-370) solution 100 mL (87 mLs Intravenous Given 12/5/17 1922)   sodium chloride 0.9 % bag 500mL for CT scan flush use (63 mLs Intravenous Given 12/5/17 1922)   ketorolac (TORADOL) injection 30 mg (30 mg Intravenous Given 12/5/17 2004)            Labs Ordered and Resulted from Time of ED Arrival Up to the Time of Departure from the ED   HCG QUAL URINE POCT - Normal   ROUTINE UA WITH MICROSCOPIC   BASIC METABOLIC PANEL   CBC WITH PLATELETS DIFFERENTIAL   NO            Assessments & Plan (with Medical  Decision Making)   This is a 32-year-old female patient coming into the emergency department with complaints of right lower quadrant pain ×3 days.  She denies all other complaints.  UA is unremarkable.  The rest of her labs are unremarkable.  Abdominal CT was slightly concerned for pathology to the right ovary.  Ultrasound confirmed that there is no significant pathology and the patient can be safely discharged with close follow-up by her primary care physician.  I will discharge her with a prescription for naproxen and Colace.  Patient understands and agrees with discharge instructions and aftercare planning.    I have reviewed the nursing notes.    I have reviewed the findings, diagnosis, plan and need for follow up with the patient.    Discharge Medication List as of 12/5/2017  9:51 PM      START taking these medications    Details   naproxen (NAPROSYN) 500 MG tablet Take 1 tablet (500 mg) by mouth 2 times daily (with meals) for 8 days, Disp-16 tablet, R-0, Local Print      docusate sodium (COLACE) 100 MG tablet Take 100 mg by mouth daily, Disp-60 tablet, R-1, Local Print             Final diagnoses:   Flank pain     ICharisse, am serving as a trained medical scribe to document services personally performed by Daniel Laird MD, based on the provider's statements to me.   IDaniel MD, was physically present and have reviewed and verified the accuracy of this note documented by Charisse Jerez.     12/5/2017   [facility], [place], EMERGENCY DEPARTMENT     Daniel Laird MD  12/05/17 0384

## 2017-12-05 NOTE — ED NOTES
Pt is A&OX4, pink, warm, dry.  Pt. Has even and unlabored respirations.  Pt. States that for the past three days she has had right lower quadrant abd pain that radiates to her back.  Pt. States urine frequency and painful urination.  Pt. Denies any nausea.  Abd is soft and tender to RLQ.  Urine sent to lab. Call light at bedside.

## 2017-12-06 NOTE — DISCHARGE INSTRUCTIONS
Please make an appointment to follow up with Your Primary Care Provider as soon as possible if you have any concerns.  Results for orders placed or performed during the hospital encounter of 12/05/17   CT Abdomen Pelvis w Contrast    Narrative    CT ABDOMEN AND PELVIS WITH CONTRAST 12/5/2017 7:27 PM     HISTORY: RLQ pain.     COMPARISON: None.    TECHNIQUE: Volumetric helical acquisition of CT images from the lung  bases through the symphysis pubis after the administration of  intravenous contrast. Radiation dose for this scan was reduced using  automated exposure control, adjustment of the mA and/or kV according  to patient size, or iterative reconstruction technique.    FINDINGS: The liver, bilateral kidneys and adrenal glands, pancreas,  and spleen demonstrate no worrisome focal lesion. Visualized portions  of the appendix unremarkable. There is a small amount of free fluid  which is nonspecific. Small cyst or follicle probably in the right  ovary although there is a somewhat tubular fluid-filled structure in  this region, its unclear if this is bowel or fallopian tube. Consider  ultrasound for further evaluation. No hydronephrosis. Unremarkable  gallbladder. No free air in the abdomen. Bone windows reveal no  destructive lesions. There are no abdominal or pelvic lymph nodes that  are abnormal by size criteria. The visualized lung bases are  unremarkable. There are no dilated loops of small bowel or colon.      Impression    IMPRESSION: Some free fluid and a possible fluid-filled tubular  structure in the right adnexal region, this could be adjacent bowel.  Consider ultrasound for further evaluation of tubo-ovarian abscess.  Small probable cyst or follicle in the right ovary. Otherwise no acute  process demonstrated in the abdomen and pelvis.   US Pelvic Complete w Transvaginal & Abd/Pel Duplex Limited    Narrative    ULTRASOUND PELVIS DOPPLER WITH TRANSVAGINAL IMAGING  12/5/2017 9:23 PM      HISTORY: RLQ pain  with ovarian concerns for possible torsion.     COMPARISON: None.    TECHNIQUE: Transvaginal images were performed to better evaluate the  patient's uterus, ovaries and endometrial stripe. Grayscale, color  Doppler, and Doppler spectral waveform analysis performed.    FINDINGS:  No fibroids are evident. The uterus is normal. Endometrial  stripe measures 13 mm and is normal for patient's age and menstrual  status. The right ovary is normal. The left ovary demonstrates a tiny  probable calcification of no clinical significance. Doppler spectral  waveform analysis demonstrates blood flow to both ovaries. No adnexal  masses are present. No free pelvic fluid is present.      Impression    IMPRESSION: No torsion demonstrated.   Routine UA with microscopic   Result Value Ref Range    Color Urine Light Yellow     Appearance Urine Clear     Glucose Urine Negative NEG^Negative mg/dL    Bilirubin Urine Negative NEG^Negative    Ketones Urine Negative NEG^Negative mg/dL    Specific Gravity Urine 1.005 1.003 - 1.035    Blood Urine Negative NEG^Negative    pH Urine 7.0 5.0 - 7.0 pH    Protein Albumin Urine Negative NEG^Negative mg/dL    Urobilinogen mg/dL Normal 0.0 - 2.0 mg/dL    Nitrite Urine Negative NEG^Negative    Leukocyte Esterase Urine Negative NEG^Negative    Source Unspecified Urine     WBC Urine <1 0 - 2 /HPF    RBC Urine 0 0 - 2 /HPF    Squamous Epithelial /HPF Urine <1 0 - 1 /HPF   Basic metabolic panel   Result Value Ref Range    Sodium 139 133 - 144 mmol/L    Potassium 3.6 3.4 - 5.3 mmol/L    Chloride 107 94 - 109 mmol/L    Carbon Dioxide 28 20 - 32 mmol/L    Anion Gap 4 3 - 14 mmol/L    Glucose 83 70 - 99 mg/dL    Urea Nitrogen 11 7 - 30 mg/dL    Creatinine 0.64 0.52 - 1.04 mg/dL    GFR Estimate >90 >60 mL/min/1.7m2    GFR Estimate If Black >90 >60 mL/min/1.7m2    Calcium 8.8 8.5 - 10.1 mg/dL   CBC with platelets differential   Result Value Ref Range    WBC 8.0 4.0 - 11.0 10e9/L    RBC Count 3.83 3.8 - 5.2 10e12/L     Hemoglobin 11.8 11.7 - 15.7 g/dL    Hematocrit 35.7 35.0 - 47.0 %    MCV 93 78 - 100 fl    MCH 30.8 26.5 - 33.0 pg    MCHC 33.1 31.5 - 36.5 g/dL    RDW 12.6 10.0 - 15.0 %    Platelet Count 273 150 - 450 10e9/L    Diff Method Automated Method     % Neutrophils 58.6 %    % Lymphocytes 32.7 %    % Monocytes 6.8 %    % Eosinophils 1.3 %    % Basophils 0.3 %    % Immature Granulocytes 0.3 %    Nucleated RBCs 0 0 /100    Absolute Neutrophil 4.7 1.6 - 8.3 10e9/L    Absolute Lymphocytes 2.6 0.8 - 5.3 10e9/L    Absolute Monocytes 0.5 0.0 - 1.3 10e9/L    Absolute Eosinophils 0.1 0.0 - 0.7 10e9/L    Absolute Basophils 0.0 0.0 - 0.2 10e9/L    Abs Immature Granulocytes 0.0 0 - 0.4 10e9/L    Absolute Nucleated RBC 0.0    hCG qual urine POCT   Result Value Ref Range    HCG Qual Urine Negative neg    Internal QC OK Yes

## 2020-01-19 ENCOUNTER — HOSPITAL ENCOUNTER (EMERGENCY)
Facility: CLINIC | Age: 35
Discharge: HOME | End: 2020-01-19
Attending: EMERGENCY MEDICINE | Admitting: EMERGENCY MEDICINE
Payer: COMMERCIAL

## 2020-01-19 VITALS
RESPIRATION RATE: 16 BRPM | TEMPERATURE: 97.8 F | SYSTOLIC BLOOD PRESSURE: 108 MMHG | OXYGEN SATURATION: 100 % | DIASTOLIC BLOOD PRESSURE: 76 MMHG

## 2020-01-19 DIAGNOSIS — T16.2XXA FOREIGN BODY OF LEFT EAR, INITIAL ENCOUNTER: ICD-10-CM

## 2020-01-19 PROCEDURE — 99283 EMERGENCY DEPT VISIT LOW MDM: CPT | Mod: Z6 | Performed by: EMERGENCY MEDICINE

## 2020-01-19 PROCEDURE — 99283 EMERGENCY DEPT VISIT LOW MDM: CPT | Performed by: EMERGENCY MEDICINE

## 2020-01-19 RX ORDER — NEOMYCIN SULFATE, POLYMYXIN B SULFATE AND HYDROCORTISONE 10; 3.5; 1 MG/ML; MG/ML; [USP'U]/ML
3 SUSPENSION/ DROPS AURICULAR (OTIC) 4 TIMES DAILY
Qty: 10 ML | Refills: 0 | Status: SHIPPED | OUTPATIENT
Start: 2020-01-19

## 2020-01-19 ASSESSMENT — ENCOUNTER SYMPTOMS
COUGH: 0
FACIAL SWELLING: 0
DIZZINESS: 0
SHORTNESS OF BREATH: 0
HEADACHES: 0
ABDOMINAL PAIN: 0
CHILLS: 0
BRUISES/BLEEDS EASILY: 0
DIAPHORESIS: 0
FEVER: 0
CONFUSION: 0

## 2020-01-19 NOTE — ED TRIAGE NOTES
Pt has arrived to the ED with pain in left ear. Pt stated pain started suddenly upon waking at approx 0100. Pt states she has no other symptoms other than some bilateral knee pain for the past few weeks.    Pt states she feels like there is something in her ear but she cleaned it out before she came in and the pain did not improve.

## 2020-01-19 NOTE — ED AVS SNAPSHOT
[facility], [place], Emergency Department  [address]  [facility] [address]  Phone:  [phone]  Fax:  [phone]                                    Keira Montoya   MRN: [id number]    Department:  [facility], Emergency Department   Date of Visit:  1/19/2020           After Visit Summary Signature Page    I have received my discharge instructions, and my questions have been answered. I have discussed any challenges I see with this plan with the nurse or doctor.    ..........................................................................................................................................  Patient/Patient Representative Signature      ..........................................................................................................................................  Patient Representative Print Name and Relationship to Patient    ..................................................               ................................................  Date                                   Time    ..........................................................................................................................................  Reviewed by Signature/Title    ...................................................              ..............................................  Date                                               Time          22EPIC Rev 08/18

## 2020-01-19 NOTE — ED PROVIDER NOTES
History     Chief Complaint   Patient presents with     Otalgia     left ear pain started suddenly upon waking at approx 0100. Pt denies any other symptoms.     SHANDA Montoya is a 34 year old female who presents with sudden onset of moving foreign body in her left ear canal. Her  tried to get it out by inserting a key into her ear canal which caused some bleeding.    I have reviewed the Medications, Allergies, Past Medical and Surgical History, and Social History in the [facility] system.  History reviewed. No pertinent past medical history.    Review of Systems   Constitutional: Negative for chills, diaphoresis and fever.   HENT: Positive for ear pain. Negative for facial swelling and hearing loss.         Foreign body sensation left ear canal.   Respiratory: Negative for cough and shortness of breath.    Cardiovascular: Negative for chest pain.   Gastrointestinal: Negative for abdominal pain.   Skin: Negative for rash.   Allergic/Immunologic: Negative for immunocompromised state.   Neurological: Negative for dizziness, syncope and headaches.   Hematological: Does not bruise/bleed easily.   Psychiatric/Behavioral: Negative for confusion.   All other systems reviewed and are negative.      Physical Exam   BP: 109/81  Heart Rate: 68  Temp: 97.3  F (36.3  C)  Resp: 16  SpO2: 100 %      Physical Exam  Vitals signs and nursing note reviewed.   Constitutional:       General: She is not in acute distress.     Appearance: She is not ill-appearing or diaphoretic.   HENT:      Head: Normocephalic and atraumatic.      Left Ear: Tympanic membrane and external ear normal. A foreign body is present.      Ears:      Comments: Small insect in ear canal. Looks like a weevil. It is not alive. There is also a superficial abrasion of the ear canal with some old blood. No active bleeding.     Nose: Nose normal.      Mouth/Throat:      Mouth: Mucous membranes are moist.      Pharynx: Oropharynx is clear. No oropharyngeal  exudate.   Eyes:      General: No scleral icterus.     Pupils: Pupils are equal, round, and reactive to light.   Neck:      Musculoskeletal: Normal range of motion and neck supple.   Cardiovascular:      Rate and Rhythm: Normal rate and regular rhythm.      Pulses: Normal pulses.      Heart sounds: Normal heart sounds.   Pulmonary:      Effort: No respiratory distress.      Breath sounds: Normal breath sounds.   Abdominal:      Tenderness: There is no abdominal tenderness.   Musculoskeletal:         General: No tenderness.   Skin:     General: Skin is warm.      Findings: No rash.   Neurological:      Mental Status: She is alert.   Psychiatric:         Mood and Affect: Mood normal.         Behavior: Behavior normal.         ED Course        Procedures             Critical Care time:  none             Labs Ordered and Resulted from Time of ED Arrival Up to the Time of Departure from the ED - No data to display         Assessments & Plan (with Medical Decision Making)   Foreign body-small insect removed with alligator and irrigation. She tolerated this well. Plan for clinic follow up and to return if persistent symptoms. Cortisporin otic suspension given.    I have reviewed the nursing notes.    I have reviewed the findings, diagnosis, plan and need for follow up with the patient.    New Prescriptions    NEOMYCIN-POLYMYXIN-HYDROCORTISONE (CORTISPORIN) [id number] OTIC SUSPENSION    Place 3 drops Into the left ear 4 times daily       Final diagnoses:   Foreign body of left ear, initial encounter       1/19/2020   [facility], [place], EMERGENCY DEPARTMENT     Jay Marino MD  01/19/20 5244

## 2020-02-25 ENCOUNTER — HOSPITAL ENCOUNTER (EMERGENCY)
Facility: CLINIC | Age: 35
Discharge: HOME | End: 2020-02-25
Attending: EMERGENCY MEDICINE | Admitting: EMERGENCY MEDICINE
Payer: COMMERCIAL

## 2020-02-25 VITALS
WEIGHT: 181 LBS | RESPIRATION RATE: 18 BRPM | SYSTOLIC BLOOD PRESSURE: 107 MMHG | DIASTOLIC BLOOD PRESSURE: 74 MMHG | BODY MASS INDEX: 33.31 KG/M2 | HEIGHT: 62 IN | TEMPERATURE: 98.2 F | OXYGEN SATURATION: 99 % | HEART RATE: 66 BPM

## 2020-02-25 DIAGNOSIS — R10.13 DYSPEPSIA: ICD-10-CM

## 2020-02-25 LAB
ALBUMIN SERPL-MCNC: 3.9 G/DL (ref 3.4–5)
ALBUMIN UR-MCNC: NEGATIVE MG/DL
ALP SERPL-CCNC: 64 U/L (ref 40–150)
ALT SERPL W P-5'-P-CCNC: 22 U/L (ref 0–50)
ANION GAP SERPL CALCULATED.3IONS-SCNC: 5 MMOL/L (ref 3–14)
APPEARANCE UR: CLEAR
AST SERPL W P-5'-P-CCNC: 17 U/L (ref 0–45)
BACTERIA #/AREA URNS HPF: ABNORMAL /HPF
BASOPHILS # BLD AUTO: 0 10E9/L (ref 0–0.2)
BASOPHILS NFR BLD AUTO: 0.4 %
BILIRUB SERPL-MCNC: 0.6 MG/DL (ref 0.2–1.3)
BILIRUB UR QL STRIP: NEGATIVE
BUN SERPL-MCNC: 16 MG/DL (ref 7–30)
CALCIUM SERPL-MCNC: 9.2 MG/DL (ref 8.5–10.1)
CHLORIDE SERPL-SCNC: 109 MMOL/L (ref 94–109)
CO2 SERPL-SCNC: 26 MMOL/L (ref 20–32)
COLOR UR AUTO: ABNORMAL
CREAT SERPL-MCNC: 0.58 MG/DL (ref 0.52–1.04)
DIFFERENTIAL METHOD BLD: NORMAL
EOSINOPHIL # BLD AUTO: 0 10E9/L (ref 0–0.7)
EOSINOPHIL NFR BLD AUTO: 0.6 %
ERYTHROCYTE [DISTWIDTH] IN BLOOD BY AUTOMATED COUNT: 11.9 % (ref 10–15)
GFR SERPL CREATININE-BSD FRML MDRD: >90 ML/MIN/{1.73_M2}
GLUCOSE SERPL-MCNC: 92 MG/DL (ref 70–99)
GLUCOSE UR STRIP-MCNC: NEGATIVE MG/DL
HCG UR QL: NEGATIVE
HCT VFR BLD AUTO: 40.1 % (ref 35–47)
HGB BLD-MCNC: 13.4 G/DL (ref 11.7–15.7)
HGB UR QL STRIP: NEGATIVE
HYALINE CASTS #/AREA URNS LPF: 1 /LPF (ref 0–2)
IMM GRANULOCYTES # BLD: 0 10E9/L (ref 0–0.4)
IMM GRANULOCYTES NFR BLD: 0 %
INTERNAL QC OK POCT: YES
KETONES UR STRIP-MCNC: NEGATIVE MG/DL
LEUKOCYTE ESTERASE UR QL STRIP: NEGATIVE
LIPASE SERPL-CCNC: 91 U/L (ref 73–393)
LYMPHOCYTES # BLD AUTO: 1.8 10E9/L (ref 0.8–5.3)
LYMPHOCYTES NFR BLD AUTO: 35.8 %
MCH RBC QN AUTO: 30.3 PG (ref 26.5–33)
MCHC RBC AUTO-ENTMCNC: 33.4 G/DL (ref 31.5–36.5)
MCV RBC AUTO: 91 FL (ref 78–100)
MONOCYTES # BLD AUTO: 0.4 10E9/L (ref 0–1.3)
MONOCYTES NFR BLD AUTO: 8 %
MUCOUS THREADS #/AREA URNS LPF: PRESENT /LPF
NEUTROPHILS # BLD AUTO: 2.7 10E9/L (ref 1.6–8.3)
NEUTROPHILS NFR BLD AUTO: 55.2 %
NITRATE UR QL: NEGATIVE
NRBC # BLD AUTO: 0 10*3/UL
NRBC BLD AUTO-RTO: 0 /100
PH UR STRIP: 5.5 PH (ref 5–7)
PLATELET # BLD AUTO: 225 10E9/L (ref 150–450)
POTASSIUM SERPL-SCNC: 3.8 MMOL/L (ref 3.4–5.3)
PROT SERPL-MCNC: 7.8 G/DL (ref 6.8–8.8)
RBC # BLD AUTO: 4.42 10E12/L (ref 3.8–5.2)
RBC #/AREA URNS AUTO: <1 /HPF (ref 0–2)
SODIUM SERPL-SCNC: 140 MMOL/L (ref 133–144)
SOURCE: ABNORMAL
SP GR UR STRIP: 1 (ref 1–1.03)
SQUAMOUS #/AREA URNS AUTO: 1 /HPF (ref 0–1)
UROBILINOGEN UR STRIP-MCNC: NORMAL MG/DL (ref 0–2)
WBC # BLD AUTO: 5 10E9/L (ref 4–11)
WBC #/AREA URNS AUTO: <1 /HPF (ref 0–5)

## 2020-02-25 PROCEDURE — 80053 COMPREHEN METABOLIC PANEL: CPT | Performed by: EMERGENCY MEDICINE

## 2020-02-25 PROCEDURE — 25000132 ZZH RX MED GY IP 250 OP 250 PS 637: Performed by: EMERGENCY MEDICINE

## 2020-02-25 PROCEDURE — 81025 URINE PREGNANCY TEST: CPT | Performed by: EMERGENCY MEDICINE

## 2020-02-25 PROCEDURE — 81025 URINE PREGNANCY TEST: CPT | Performed by: FAMILY MEDICINE

## 2020-02-25 PROCEDURE — 96361 HYDRATE IV INFUSION ADD-ON: CPT | Performed by: EMERGENCY MEDICINE

## 2020-02-25 PROCEDURE — 83690 ASSAY OF LIPASE: CPT | Performed by: EMERGENCY MEDICINE

## 2020-02-25 PROCEDURE — 81001 URINALYSIS AUTO W/SCOPE: CPT | Performed by: EMERGENCY MEDICINE

## 2020-02-25 PROCEDURE — 99284 EMERGENCY DEPT VISIT MOD MDM: CPT | Mod: Z6 | Performed by: EMERGENCY MEDICINE

## 2020-02-25 PROCEDURE — 25000128 H RX IP 250 OP 636: Performed by: EMERGENCY MEDICINE

## 2020-02-25 PROCEDURE — 85025 COMPLETE CBC W/AUTO DIFF WBC: CPT | Performed by: EMERGENCY MEDICINE

## 2020-02-25 PROCEDURE — 25800030 ZZH RX IP 258 OP 636: Performed by: EMERGENCY MEDICINE

## 2020-02-25 PROCEDURE — 99284 EMERGENCY DEPT VISIT MOD MDM: CPT | Mod: 25 | Performed by: EMERGENCY MEDICINE

## 2020-02-25 PROCEDURE — 96374 THER/PROPH/DIAG INJ IV PUSH: CPT | Performed by: EMERGENCY MEDICINE

## 2020-02-25 PROCEDURE — 25000125 ZZHC RX 250: Performed by: EMERGENCY MEDICINE

## 2020-02-25 RX ORDER — ACETAMINOPHEN 500 MG
500-1000 TABLET ORAL EVERY 6 HOURS PRN
COMMUNITY
End: 2020-09-11

## 2020-02-25 RX ORDER — ONDANSETRON 2 MG/ML
4 INJECTION INTRAMUSCULAR; INTRAVENOUS ONCE
Status: COMPLETED | OUTPATIENT
Start: 2020-02-25 | End: 2020-02-25

## 2020-02-25 RX ORDER — SUCRALFATE 1 G/1
1 TABLET ORAL 4 TIMES DAILY
Qty: 30 TABLET | Refills: 0 | Status: SHIPPED | OUTPATIENT
Start: 2020-02-25

## 2020-02-25 RX ADMIN — SODIUM CHLORIDE 1000 ML: 9 INJECTION, SOLUTION INTRAVENOUS at 08:17

## 2020-02-25 RX ADMIN — ONDANSETRON 4 MG: 2 INJECTION INTRAMUSCULAR; INTRAVENOUS at 08:17

## 2020-02-25 RX ADMIN — LIDOCAINE HYDROCHLORIDE 30 ML: 20 SOLUTION ORAL; TOPICAL at 09:51

## 2020-02-25 ASSESSMENT — ENCOUNTER SYMPTOMS
DIFFICULTY URINATING: 0
FEVER: 0
CONFUSION: 0
ABDOMINAL PAIN: 1
ARTHRALGIAS: 0
COLOR CHANGE: 0
EYE REDNESS: 0
NECK STIFFNESS: 0
DIARRHEA: 1
SHORTNESS OF BREATH: 0
HEADACHES: 0
NAUSEA: 1

## 2020-02-25 ASSESSMENT — MIFFLIN-ST. JEOR: SCORE: 1484.26

## 2020-02-25 NOTE — ED PROVIDER NOTES
"  History     Chief Complaint   Patient presents with     Abdominal Pain     epigastric pain started Sunday afternoon, + n/v, + diarrhea for 2 days     HPI  Keira Montoya is a 34 year old female who was to the emergency department with epigastric and left upper quadrant abdominal pain for the past 3 days.  Patient states that she has a constant burning sensation in the upper abdomen.  She denies any radiation.  She has had some nausea but denies vomiting.  Patient also developed diarrhea 2 days ago as well.  She states the diarrhea is been brown and watery.  She denies any blood or melena.  Patient denies any fever.  No chest pain or dyspnea.  She denies any dysuria, urgency, or frequency.  She restarted Protonix Sunday after she developed her upper abdominal pain.  She also took it yesterday morning.    I have reviewed the Medications, Allergies, Past Medical and Surgical History, and Social History in the Epic system.    Review of Systems   Constitutional: Negative for fever.   HENT: Negative for congestion.    Eyes: Negative for redness.   Respiratory: Negative for shortness of breath.    Cardiovascular: Negative for chest pain.   Gastrointestinal: Positive for abdominal pain, diarrhea and nausea.   Genitourinary: Negative for difficulty urinating.   Musculoskeletal: Negative for arthralgias and neck stiffness.   Skin: Negative for color change.   Neurological: Negative for headaches.   Psychiatric/Behavioral: Negative for confusion.   All other systems reviewed and are negative.      Physical Exam   BP: 119/70  Pulse: 71  Temp: 98  F (36.7  C)  Resp: 18  Height: 157.5 cm (5' 2\")  Weight: 82.1 kg (181 lb)  SpO2: 99 %      Physical Exam  Vitals signs and nursing note reviewed.   Constitutional:       General: She is not in acute distress.     Appearance: She is not diaphoretic.   HENT:      Head: Normocephalic and atraumatic.      Mouth/Throat:      Pharynx: No oropharyngeal exudate.   Eyes:      General: No " scleral icterus.     Extraocular Movements: Extraocular movements intact.      Pupils: Pupils are equal, round, and reactive to light.   Cardiovascular:      Rate and Rhythm: Normal rate and regular rhythm.      Heart sounds: Normal heart sounds.   Pulmonary:      Effort: No respiratory distress.      Breath sounds: Normal breath sounds.   Abdominal:      General: Bowel sounds are normal.      Palpations: Abdomen is soft.      Tenderness: There is abdominal tenderness in the epigastric area.   Musculoskeletal:         General: No tenderness.   Skin:     General: Skin is warm.      Findings: No rash.   Neurological:      General: No focal deficit present.      Mental Status: She is alert.         ED Course        Procedures    Results for orders placed or performed during the hospital encounter of 02/25/20   CBC with platelets differential     Status: None   Result Value Ref Range    WBC 5.0 4.0 - 11.0 10e9/L    RBC Count 4.42 3.8 - 5.2 10e12/L    Hemoglobin 13.4 11.7 - 15.7 g/dL    Hematocrit 40.1 35.0 - 47.0 %    MCV 91 78 - 100 fl    MCH 30.3 26.5 - 33.0 pg    MCHC 33.4 31.5 - 36.5 g/dL    RDW 11.9 10.0 - 15.0 %    Platelet Count 225 150 - 450 10e9/L    Diff Method Automated Method     % Neutrophils 55.2 %    % Lymphocytes 35.8 %    % Monocytes 8.0 %    % Eosinophils 0.6 %    % Basophils 0.4 %    % Immature Granulocytes 0.0 %    Nucleated RBCs 0 0 /100    Absolute Neutrophil 2.7 1.6 - 8.3 10e9/L    Absolute Lymphocytes 1.8 0.8 - 5.3 10e9/L    Absolute Monocytes 0.4 0.0 - 1.3 10e9/L    Absolute Eosinophils 0.0 0.0 - 0.7 10e9/L    Absolute Basophils 0.0 0.0 - 0.2 10e9/L    Abs Immature Granulocytes 0.0 0 - 0.4 10e9/L    Absolute Nucleated RBC 0.0    Comprehensive metabolic panel     Status: None   Result Value Ref Range    Sodium 140 133 - 144 mmol/L    Potassium 3.8 3.4 - 5.3 mmol/L    Chloride 109 94 - 109 mmol/L    Carbon Dioxide 26 20 - 32 mmol/L    Anion Gap 5 3 - 14 mmol/L    Glucose 92 70 - 99 mg/dL    Urea  Nitrogen 16 7 - 30 mg/dL    Creatinine 0.58 0.52 - 1.04 mg/dL    GFR Estimate >90 >60 mL/min/[1.73_m2]    GFR Estimate If Black >90 >60 mL/min/[1.73_m2]    Calcium 9.2 8.5 - 10.1 mg/dL    Bilirubin Total 0.6 0.2 - 1.3 mg/dL    Albumin 3.9 3.4 - 5.0 g/dL    Protein Total 7.8 6.8 - 8.8 g/dL    Alkaline Phosphatase 64 40 - 150 U/L    ALT 22 0 - 50 U/L    AST 17 0 - 45 U/L   Lipase     Status: None   Result Value Ref Range    Lipase 91 73 - 393 U/L   UA with Microscopic     Status: Abnormal   Result Value Ref Range    Color Urine Straw     Appearance Urine Clear     Glucose Urine Negative NEG^Negative mg/dL    Bilirubin Urine Negative NEG^Negative    Ketones Urine Negative NEG^Negative mg/dL    Specific Gravity Urine 1.004 1.003 - 1.035    Blood Urine Negative NEG^Negative    pH Urine 5.5 5.0 - 7.0 pH    Protein Albumin Urine Negative NEG^Negative mg/dL    Urobilinogen mg/dL Normal 0.0 - 2.0 mg/dL    Nitrite Urine Negative NEG^Negative    Leukocyte Esterase Urine Negative NEG^Negative    Source Midstream Urine     WBC Urine <1 0 - 5 /HPF    RBC Urine <1 0 - 2 /HPF    Bacteria Urine Few (A) NEG^Negative /HPF    Squamous Epithelial /HPF Urine 1 0 - 1 /HPF    Mucous Urine Present (A) NEG^Negative /LPF    Hyaline Casts 1 0 - 2 /LPF   hCG qual urine POCT     Status: Normal   Result Value Ref Range    HCG Qual Urine Negative neg    Internal QC OK Yes           Medications   0.9% sodium chloride BOLUS (0 mLs Intravenous Stopped 2/25/20 1037)   ondansetron (ZOFRAN) injection 4 mg (4 mg Intravenous Given 2/25/20 0817)   lidocaine (XYLOCAINE) 2 % 15 mL, alum & mag hydroxide-simethicone (MAALOX  ES) 15 mL GI Cocktail (30 mLs Oral Given 2/25/20 0951)         10:33 AM Feeling better.         Assessments & Plan (with Medical Decision Making)   34 year old female with epigastric abdominal pain.  She also complains of diarrhea but is unable to provide a stool sample here.  Her labs are normal.  Her symptoms resolved after GI cocktail.   She has been taking omeprazole for the past 2 days.  She was encouraged to continue so.  Additionally, Carafate prescribed.  Primary care follow-up recommended.    I have reviewed the nursing notes.    I have reviewed the findings, diagnosis, plan and need for follow up with the patient.    Discharge Medication List as of 2/25/2020 10:38 AM      START taking these medications    Details   sucralfate (CARAFATE) 1 GM tablet Take 1 tablet (1 g) by mouth 4 times daily, Disp-30 tablet, R-0, Local Print             Final diagnoses:   Dyspepsia       2/25/2020   [facility], [place], EMERGENCY DEPARTMENT     Hang Funes MD  02/25/20 1000

## 2020-02-25 NOTE — DISCHARGE INSTRUCTIONS
Continue Protonix.  Take Carafate as directed.  Drink plenty of fluids.    Follow-up with your primary care provider in 1 week as needed.    Return to the emergency department if fever, vomiting, worsening symptoms, or other concerns.

## 2020-02-25 NOTE — ED AVS SNAPSHOT
[facility], [place], Emergency Department  [address]  [facility] [address]  Phone:  [phone]  Fax:  [phone]                                    Keira Montoya   MRN: [id number]    Department:  [facility], Emergency Department   Date of Visit:  2/25/2020           After Visit Summary Signature Page    I have received my discharge instructions, and my questions have been answered. I have discussed any challenges I see with this plan with the nurse or doctor.    ..........................................................................................................................................  Patient/Patient Representative Signature      ..........................................................................................................................................  Patient Representative Print Name and Relationship to Patient    ..................................................               ................................................  Date                                   Time    ..........................................................................................................................................  Reviewed by Signature/Title    ...................................................              ..............................................  Date                                               Time          22EPIC Rev 08/18

## 2020-09-11 ENCOUNTER — APPOINTMENT (OUTPATIENT)
Dept: ULTRASOUND IMAGING | Facility: CLINIC | Age: 35
End: 2020-09-11
Attending: FAMILY MEDICINE
Payer: COMMERCIAL

## 2020-09-11 ENCOUNTER — HOSPITAL ENCOUNTER (EMERGENCY)
Facility: CLINIC | Age: 35
Discharge: HOME | End: 2020-09-11
Attending: FAMILY MEDICINE | Admitting: FAMILY MEDICINE
Payer: COMMERCIAL

## 2020-09-11 VITALS
BODY MASS INDEX: 34.57 KG/M2 | WEIGHT: 189 LBS | DIASTOLIC BLOOD PRESSURE: 78 MMHG | TEMPERATURE: 97.7 F | SYSTOLIC BLOOD PRESSURE: 117 MMHG | HEART RATE: 76 BPM | RESPIRATION RATE: 16 BRPM | OXYGEN SATURATION: 100 %

## 2020-09-11 DIAGNOSIS — R10.2 PELVIC PAIN IN FEMALE: ICD-10-CM

## 2020-09-11 LAB
ALBUMIN SERPL-MCNC: 3.7 G/DL (ref 3.4–5)
ALBUMIN UR-MCNC: NEGATIVE MG/DL
ALP SERPL-CCNC: 61 U/L (ref 40–150)
ALT SERPL W P-5'-P-CCNC: 18 U/L (ref 0–50)
ANION GAP SERPL CALCULATED.3IONS-SCNC: 6 MMOL/L (ref 3–14)
APPEARANCE UR: CLEAR
AST SERPL W P-5'-P-CCNC: 14 U/L (ref 0–45)
B-HCG SERPL-ACNC: <1 IU/L (ref 0–5)
BASOPHILS # BLD AUTO: 0 10E9/L (ref 0–0.2)
BASOPHILS NFR BLD AUTO: 0.3 %
BILIRUB SERPL-MCNC: 0.3 MG/DL (ref 0.2–1.3)
BILIRUB UR QL STRIP: NEGATIVE
BUN SERPL-MCNC: 16 MG/DL (ref 7–30)
CALCIUM SERPL-MCNC: 8.4 MG/DL (ref 8.5–10.1)
CHLORIDE SERPL-SCNC: 108 MMOL/L (ref 94–109)
CO2 SERPL-SCNC: 26 MMOL/L (ref 20–32)
COLOR UR AUTO: NORMAL
CREAT SERPL-MCNC: 0.57 MG/DL (ref 0.52–1.04)
DIFFERENTIAL METHOD BLD: NORMAL
EOSINOPHIL # BLD AUTO: 0 10E9/L (ref 0–0.7)
EOSINOPHIL NFR BLD AUTO: 0.6 %
ERYTHROCYTE [DISTWIDTH] IN BLOOD BY AUTOMATED COUNT: 12.5 % (ref 10–15)
GFR SERPL CREATININE-BSD FRML MDRD: >90 ML/MIN/{1.73_M2}
GLUCOSE SERPL-MCNC: 85 MG/DL (ref 70–99)
GLUCOSE UR STRIP-MCNC: NEGATIVE MG/DL
HCG UR QL: NEGATIVE
HCT VFR BLD AUTO: 37.3 % (ref 35–47)
HGB BLD-MCNC: 12.2 G/DL (ref 11.7–15.7)
HGB UR QL STRIP: NEGATIVE
IMM GRANULOCYTES # BLD: 0 10E9/L (ref 0–0.4)
IMM GRANULOCYTES NFR BLD: 0 %
INTERNAL QC OK POCT: YES
KETONES UR STRIP-MCNC: NEGATIVE MG/DL
LEUKOCYTE ESTERASE UR QL STRIP: NEGATIVE
LYMPHOCYTES # BLD AUTO: 2.4 10E9/L (ref 0.8–5.3)
LYMPHOCYTES NFR BLD AUTO: 37.5 %
MCH RBC QN AUTO: 30.7 PG (ref 26.5–33)
MCHC RBC AUTO-ENTMCNC: 32.7 G/DL (ref 31.5–36.5)
MCV RBC AUTO: 94 FL (ref 78–100)
MONOCYTES # BLD AUTO: 0.4 10E9/L (ref 0–1.3)
MONOCYTES NFR BLD AUTO: 6.6 %
NEUTROPHILS # BLD AUTO: 3.5 10E9/L (ref 1.6–8.3)
NEUTROPHILS NFR BLD AUTO: 55 %
NITRATE UR QL: NEGATIVE
NRBC # BLD AUTO: 0 10*3/UL
NRBC BLD AUTO-RTO: 0 /100
PH UR STRIP: 6.5 PH (ref 5–7)
PLATELET # BLD AUTO: 232 10E9/L (ref 150–450)
POTASSIUM SERPL-SCNC: 3.9 MMOL/L (ref 3.4–5.3)
PROT SERPL-MCNC: 7.6 G/DL (ref 6.8–8.8)
RBC # BLD AUTO: 3.98 10E12/L (ref 3.8–5.2)
SODIUM SERPL-SCNC: 140 MMOL/L (ref 133–144)
SOURCE: NORMAL
SP GR UR STRIP: 1.02 (ref 1–1.03)
UROBILINOGEN UR STRIP-MCNC: NORMAL MG/DL (ref 0–2)
WBC # BLD AUTO: 6.4 10E9/L (ref 4–11)

## 2020-09-11 PROCEDURE — 85025 COMPLETE CBC W/AUTO DIFF WBC: CPT | Performed by: FAMILY MEDICINE

## 2020-09-11 PROCEDURE — 81025 URINE PREGNANCY TEST: CPT | Performed by: FAMILY MEDICINE

## 2020-09-11 PROCEDURE — 80053 COMPREHEN METABOLIC PANEL: CPT | Performed by: FAMILY MEDICINE

## 2020-09-11 PROCEDURE — 84702 CHORIONIC GONADOTROPIN TEST: CPT | Performed by: FAMILY MEDICINE

## 2020-09-11 PROCEDURE — 81003 URINALYSIS AUTO W/O SCOPE: CPT | Performed by: FAMILY MEDICINE

## 2020-09-11 PROCEDURE — 99284 EMERGENCY DEPT VISIT MOD MDM: CPT | Mod: 25

## 2020-09-11 PROCEDURE — 76856 US EXAM PELVIC COMPLETE: CPT

## 2020-09-11 PROCEDURE — 99284 EMERGENCY DEPT VISIT MOD MDM: CPT | Mod: Z6 | Performed by: FAMILY MEDICINE

## 2020-09-11 RX ORDER — ACETAMINOPHEN 500 MG
500-1000 TABLET ORAL EVERY 6 HOURS PRN
Qty: 30 TABLET | Refills: 0 | Status: SHIPPED | OUTPATIENT
Start: 2020-09-11 | End: 2020-09-18

## 2020-09-11 NOTE — ED AVS SNAPSHOT
[facility], [place], Emergency Department  [address]  [facility] [address]  Phone:  [phone]  Fax:  [phone]                                    Keira Montoya   MRN: [id number]    Department:  [facility], Emergency Department   Date of Visit:  9/11/2020           After Visit Summary Signature Page    I have received my discharge instructions, and my questions have been answered. I have discussed any challenges I see with this plan with the nurse or doctor.    ..........................................................................................................................................  Patient/Patient Representative Signature      ..........................................................................................................................................  Patient Representative Print Name and Relationship to Patient    ..................................................               ................................................  Date                                   Time    ..........................................................................................................................................  Reviewed by Signature/Title    ...................................................              ..............................................  Date                                               Time          22EPIC Rev 08/18

## 2020-09-11 NOTE — ED PROVIDER NOTES
[facility] EMERGENCY DEPARTMENT ([facility])    20        History     Chief Complaint   Patient presents with     Abdominal Pain     LMP started  and ended yesterday, at clinic today had 2 HCG tests today one pos, one neg has low abd pain and dysuria     The history is provided by the patient.     Keira Montoya is a 34 year old female with a history of GERD and s/p  who presents with pelvic pain. The patient states that she saw her PCP this morning for the same issue, she reports a urine pregnancy test was positive, but the blood test was negative. The patient admits to heavier vaginal bleeding in her LMP and states it lasted longer than usual (beginning , and ending yesterday). She admits to pelvic pain and describes the pain as cramping. She has not taken any medications for this. She states she feels pressure while passing urine and is experiencing dysuria as well. She states she has had 3 pregnancies, and has 3 children.     I have reviewed the Medications, Allergies, Past Medical and Surgical History, and Social History in the [employer] system.  PAST MEDICAL HISTORY: No past medical history on file.    PAST SURGICAL HISTORY:   Past Surgical History:   Procedure Laterality Date     GYN SURGERY         Past medical history, past surgical history, medications, and allergies were reviewed with the patient. Additional pertinent items: None    FAMILY HISTORY: No family history on file.    SOCIAL HISTORY:   Social History     Tobacco Use     Smoking status: Never Smoker     Smokeless tobacco: Never Used   Substance Use Topics     Alcohol use: No     Social history was reviewed with the patient. Additional pertinent items: None      Discharge Medication List as of 2020  9:46 PM      CONTINUE these medications which have CHANGED    Details   acetaminophen (TYLENOL) 500 MG tablet Take 1-2 tablets (500-1,000 mg) by mouth every 6 hours as needed for pain, Disp-30 tablet,R-0, Local  Print         CONTINUE these medications which have NOT CHANGED    Details   acetaminophen-codeine (TYLENOL/CODEINE #3) 300-30 MG per tablet Take 1-2 tablets by mouth every 6 hours as needed for pain, Disp-20 tablet, R-0, Local Print      docusate sodium (COLACE) 100 MG tablet Take 100 mg by mouth daily, Disp-60 tablet, R-1, Local Print      neomycin-polymyxin-hydrocortisone (CORTISPORIN) 3.5-74994-1 otic suspension Place 3 drops Into the left ear 4 times daily, Disp-10 mL, R-0, Local Print      sucralfate (CARAFATE) 1 GM tablet Take 1 tablet (1 g) by mouth 4 times daily, Disp-30 tablet, R-0, Local Print              No Known Allergies     Review of Systems  ROS: 14 point ROS neg other than the symptoms noted above in the HPI.    Physical Exam   BP: 114/77  Pulse: 78  Temp: 97.7  F (36.5  C)  Resp: 14  Weight: 85.7 kg (189 lb)  SpO2: 99 %      Physical Exam  Constitutional:       General: She is not in acute distress.     Appearance: She is not diaphoretic.   HENT:      Head: Atraumatic.      Mouth/Throat:      Pharynx: No oropharyngeal exudate.   Eyes:      General: No scleral icterus.     Pupils: Pupils are equal, round, and reactive to light.   Cardiovascular:      Heart sounds: Normal heart sounds.   Pulmonary:      Effort: No respiratory distress.      Breath sounds: Normal breath sounds.   Abdominal:      General: Bowel sounds are normal.      Palpations: Abdomen is soft.      Tenderness: There is abdominal tenderness in the suprapubic area. There is no guarding or rebound.   Musculoskeletal:         General: No tenderness.   Skin:     General: Skin is warm.      Findings: No rash.         ED Course   6:55 PM  The patient was seen and examined by Victoriano Hutchinson MD in Room ED06.      Procedures                           Results for orders placed or performed during the hospital encounter of 09/11/20 (from the past 24 hour(s))   Comprehensive metabolic panel   Result Value Ref Range    Sodium 140 133 - 144  mmol/L    Potassium 3.9 3.4 - 5.3 mmol/L    Chloride 108 94 - 109 mmol/L    Carbon Dioxide 26 20 - 32 mmol/L    Anion Gap 6 3 - 14 mmol/L    Glucose 85 70 - 99 mg/dL    Urea Nitrogen 16 7 - 30 mg/dL    Creatinine 0.57 0.52 - 1.04 mg/dL    GFR Estimate >90 >60 mL/min/[1.73_m2]    GFR Estimate If Black >90 >60 mL/min/[1.73_m2]    Calcium 8.4 (L) 8.5 - 10.1 mg/dL    Bilirubin Total 0.3 0.2 - 1.3 mg/dL    Albumin 3.7 3.4 - 5.0 g/dL    Protein Total 7.6 6.8 - 8.8 g/dL    Alkaline Phosphatase 61 40 - 150 U/L    ALT 18 0 - 50 U/L    AST 14 0 - 45 U/L   CBC with platelets differential   Result Value Ref Range    WBC 6.4 4.0 - 11.0 10e9/L    RBC Count 3.98 3.8 - 5.2 10e12/L    Hemoglobin 12.2 11.7 - 15.7 g/dL    Hematocrit 37.3 35.0 - 47.0 %    MCV 94 78 - 100 fl    MCH 30.7 26.5 - 33.0 pg    MCHC 32.7 31.5 - 36.5 g/dL    RDW 12.5 10.0 - 15.0 %    Platelet Count 232 150 - 450 10e9/L    Diff Method Automated Method     % Neutrophils 55.0 %    % Lymphocytes 37.5 %    % Monocytes 6.6 %    % Eosinophils 0.6 %    % Basophils 0.3 %    % Immature Granulocytes 0.0 %    Nucleated RBCs 0 0 /100    Absolute Neutrophil 3.5 1.6 - 8.3 10e9/L    Absolute Lymphocytes 2.4 0.8 - 5.3 10e9/L    Absolute Monocytes 0.4 0.0 - 1.3 10e9/L    Absolute Eosinophils 0.0 0.0 - 0.7 10e9/L    Absolute Basophils 0.0 0.0 - 0.2 10e9/L    Abs Immature Granulocytes 0.0 0 - 0.4 10e9/L    Absolute Nucleated RBC 0.0    HCG quantitative pregnancy   Result Value Ref Range    HCG Quantitative Serum <1 0 - 5 IU/L   UA reflex to Microscopic and Culture    Specimen: Urine clean catch; Unspecified Urine   Result Value Ref Range    Color Urine Light Yellow     Appearance Urine Clear     Glucose Urine Negative NEG^Negative mg/dL    Bilirubin Urine Negative NEG^Negative    Ketones Urine Negative NEG^Negative mg/dL    Specific Gravity Urine 1.016 1.003 - 1.035    Blood Urine Negative NEG^Negative    pH Urine 6.5 5.0 - 7.0 pH    Protein Albumin Urine Negative NEG^Negative  mg/dL    Urobilinogen mg/dL Normal 0.0 - 2.0 mg/dL    Nitrite Urine Negative NEG^Negative    Leukocyte Esterase Urine Negative NEG^Negative    Source Unspecified Urine    hCG qual urine POCT   Result Value Ref Range    HCG Qual Urine Negative neg    Internal QC OK Yes    US Pelvis Cmpl wo Transvaginal w Abd/Pel Duplex Lmt    Narrative    US PELVIS CMPL WO TRANSVAGINAL W ABD/PEL DUPLEX LIMITED 9/11/2020 9:14  PM    CLINICAL HISTORY: Pelvic pain  TECHNIQUE: Transabdominal scans were performed.    COMPARISON: None.    FINDINGS:  The patient refused the endovaginal portion of the exam.  UTERUS: 12.2 x 4.6 x 7.0 cm. Normal in size and position with no  masses visualized.    ENDOMETRIUM: 12 mm. Normal smooth endometrium.    RIGHT OVARY: 2.6 x 1.6 x 2.6 cm. Normal with flow demonstrated.    LEFT OVARY: 3.8 x 2.2 x 2.2 cm. Normal with flow demonstrated.  Dominant physiologic follicle requiring no additional follow-up.    No significant free fluid.      Impression    IMPRESSION:  Normal transabdominal pelvic ultrasound.      PETR GREY MD     Medications - No data to display          Assessments & Plan (with Medical Decision Making)   34-year-old woman presenting with pelvic pain and vaginal bleeding.   Initial differential diagnosis includes ectopic pregnancy, threatened/incomplete/spontaneous AB, ovarian cyst, ovarian cyst rupture, ovarian torsion, PID, salpingitis or hydrosalpinx, TOA, dysmenorrhea, pelvic floor muscle spasm, UTI or pyelonephritis, ureterolithiasis, also appendicitis/colitis/diverticulitis or other causes of lower abdominal pain referred to the pelvis.  Exam revealing normal vital signs.  She has lower abdominal tenderness in the suprapubic area and into the left adnexa.  No peritoneal signs.  The remainder of a complete physical exam is normal.  Patient is not pregnant, her urinalysis is unremarkable, hemoglobin and white count normal.  Pelvic ultrasound is without any concerning findings.  Patient  reported symptomatic improvement with a single dose of Tylenol.  Based on the clinical findings and the entire clinical scenario, the patient appears stable at this time to be treated symptomatically, and to follow-up as an outpatient for any further evaluation and treatment.  Discussed expected course, need for follow up, and indications for return with the patient.  See discharge instructions.      I have reviewed the nursing notes.    I have reviewed the findings, diagnosis, plan and need for follow up with the patient.    Discharge Medication List as of 9/11/2020  9:46 PM          Final diagnoses:   Pelvic pain in female       9/11/2020   [facility], EMERGENCY DEPARTMENT    I, Carina Jackson, am serving as a trained medical scribe to document services personally performed by Victoriano Hutchinson MD, based on the provider's statements to me.     I, Victoriano Hutchinson MD, was physically present and have reviewed and verified the accuracy of this note documented by Carina Jackson.       Victoriano Hutchinson MD  09/11/20 5750

## 2020-09-12 NOTE — DISCHARGE INSTRUCTIONS
Thank you for choosing [facility].     Please closely monitor for further symptoms. Return to the Emergency Department if you develop any new or worsening signs or symptoms.    If you received any opiate pain medications or sedatives during your visit, please do not drive for at least 8 hours.     Labs, cultures or final xray interpretations may still need to be reviewed.  We will call you if your plan of care needs to be changed.    Please follow up with your primary care physician or clinic next week if your symptoms are not resolving.

## 2021-10-02 ENCOUNTER — HOSPITAL ENCOUNTER (EMERGENCY)
Facility: CLINIC | Age: 36
Discharge: HOME | End: 2021-10-03
Attending: EMERGENCY MEDICINE | Admitting: EMERGENCY MEDICINE
Payer: COMMERCIAL

## 2021-10-02 ENCOUNTER — APPOINTMENT (OUTPATIENT)
Dept: ULTRASOUND IMAGING | Facility: CLINIC | Age: 36
End: 2021-10-02
Attending: EMERGENCY MEDICINE
Payer: COMMERCIAL

## 2021-10-02 VITALS
RESPIRATION RATE: 18 BRPM | HEART RATE: 72 BPM | SYSTOLIC BLOOD PRESSURE: 98 MMHG | OXYGEN SATURATION: 100 % | TEMPERATURE: 98.3 F | DIASTOLIC BLOOD PRESSURE: 66 MMHG

## 2021-10-02 LAB
ABO/RH(D): NORMAL
ALBUMIN SERPL-MCNC: 3.6 G/DL (ref 3.4–5)
ALP SERPL-CCNC: 52 U/L (ref 40–150)
ALT SERPL W P-5'-P-CCNC: 22 U/L (ref 0–50)
ANION GAP SERPL CALCULATED.3IONS-SCNC: 5 MMOL/L (ref 3–14)
ANTIBODY SCREEN: NEGATIVE
AST SERPL W P-5'-P-CCNC: 15 U/L (ref 0–45)
B-HCG SERPL-ACNC: ABNORMAL IU/L (ref 0–5)
BASOPHILS # BLD AUTO: 0 10E3/UL (ref 0–0.2)
BASOPHILS NFR BLD AUTO: 0 %
BILIRUB SERPL-MCNC: 0.3 MG/DL (ref 0.2–1.3)
BUN SERPL-MCNC: 21 MG/DL (ref 7–30)
CALCIUM SERPL-MCNC: 8.8 MG/DL (ref 8.5–10.1)
CHLORIDE BLD-SCNC: 107 MMOL/L (ref 94–109)
CO2 SERPL-SCNC: 24 MMOL/L (ref 20–32)
CREAT SERPL-MCNC: 0.52 MG/DL (ref 0.52–1.04)
EOSINOPHIL # BLD AUTO: 0.1 10E3/UL (ref 0–0.7)
EOSINOPHIL NFR BLD AUTO: 1 %
ERYTHROCYTE [DISTWIDTH] IN BLOOD BY AUTOMATED COUNT: 12.5 % (ref 10–15)
GFR SERPL CREATININE-BSD FRML MDRD: >90 ML/MIN/1.73M2
GLUCOSE BLD-MCNC: 81 MG/DL (ref 70–99)
HCT VFR BLD AUTO: 35.8 % (ref 35–47)
HGB BLD-MCNC: 11.8 G/DL (ref 11.7–15.7)
HOLD SPECIMEN: NORMAL
HOLD SPECIMEN: NORMAL
IMM GRANULOCYTES # BLD: 0 10E3/UL
IMM GRANULOCYTES NFR BLD: 0 %
LYMPHOCYTES # BLD AUTO: 2.9 10E3/UL (ref 0.8–5.3)
LYMPHOCYTES NFR BLD AUTO: 40 %
MCH RBC QN AUTO: 30.3 PG (ref 26.5–33)
MCHC RBC AUTO-ENTMCNC: 33 G/DL (ref 31.5–36.5)
MCV RBC AUTO: 92 FL (ref 78–100)
MONOCYTES # BLD AUTO: 0.5 10E3/UL (ref 0–1.3)
MONOCYTES NFR BLD AUTO: 7 %
NEUTROPHILS # BLD AUTO: 3.8 10E3/UL (ref 1.6–8.3)
NEUTROPHILS NFR BLD AUTO: 52 %
NRBC # BLD AUTO: 0 10E3/UL
NRBC BLD AUTO-RTO: 0 /100
PLATELET # BLD AUTO: 212 10E3/UL (ref 150–450)
POTASSIUM BLD-SCNC: 3.5 MMOL/L (ref 3.4–5.3)
PROT SERPL-MCNC: 7.7 G/DL (ref 6.8–8.8)
RBC # BLD AUTO: 3.89 10E6/UL (ref 3.8–5.2)
SODIUM SERPL-SCNC: 136 MMOL/L (ref 133–144)
SPECIMEN EXPIRATION DATE: NORMAL
WBC # BLD AUTO: 7.3 10E3/UL (ref 4–11)

## 2021-10-02 PROCEDURE — 85025 COMPLETE CBC W/AUTO DIFF WBC: CPT | Performed by: EMERGENCY MEDICINE

## 2021-10-02 PROCEDURE — 99283 EMERGENCY DEPT VISIT LOW MDM: CPT | Performed by: EMERGENCY MEDICINE

## 2021-10-02 PROCEDURE — 80053 COMPREHEN METABOLIC PANEL: CPT | Performed by: EMERGENCY MEDICINE

## 2021-10-02 PROCEDURE — 36415 COLL VENOUS BLD VENIPUNCTURE: CPT | Performed by: EMERGENCY MEDICINE

## 2021-10-02 PROCEDURE — 99284 EMERGENCY DEPT VISIT MOD MDM: CPT | Mod: 25 | Performed by: EMERGENCY MEDICINE

## 2021-10-02 PROCEDURE — 84702 CHORIONIC GONADOTROPIN TEST: CPT | Performed by: EMERGENCY MEDICINE

## 2021-10-02 PROCEDURE — 76801 OB US < 14 WKS SINGLE FETUS: CPT

## 2021-10-02 PROCEDURE — 86900 BLOOD TYPING SEROLOGIC ABO: CPT | Performed by: EMERGENCY MEDICINE

## 2021-10-02 ASSESSMENT — ENCOUNTER SYMPTOMS
COUGH: 0
LIGHT-HEADEDNESS: 1
VOMITING: 0
SORE THROAT: 0
BACK PAIN: 0
DIZZINESS: 1
ABDOMINAL PAIN: 0
DIARRHEA: 0
NAUSEA: 0
FEVER: 0
RHINORRHEA: 0
CHILLS: 0
SHORTNESS OF BREATH: 0

## 2021-10-03 NOTE — ED PROVIDER NOTES
ED Provider Note  [facility]      History     Chief Complaint   Patient presents with     Vaginal Bleeding     Onset today with back pain, suddenly at 5 pm started vaginal bleeding, 9 weeks pregnant.     The history is provided by the patient and medical records.     Keira Montoya is a 35 year old female who presents to the ED for an evaluation of vaginal bleeding. Patient reports that she is approx 9 weeks pregnant.  She is . She reports an onset of low back pain today and it continued to increase until 5pm when she was washing dishes and had a sudden gush of bright red blood. She states her back pain went away at that time. Since the initial vaginal bleeding she has had some blood on a pad and when she wipes. She notes some clots the size of her fingertip. She notes her last period was . Patient notes some dizziness and lightheadedness here in the ED. Denies abdominal cramps. Patient has had one prior . Denies fever, chills, nausea, vomiting, diarrhea. No rhinorrhea or sore throat. No cough, shortness of breath, chest pain, or abdominal pain. Patient is vaccinated for Covid-19.     US PREGNANCY-TRANSVAGINAL 2021 ([facility])  Impression:   1. Single living intrauterine pregnancy dating 6 weeks and 4 days. EDC is 2022.   2. No evidence of ectopic pregnancy.         Past Medical History  History reviewed. No pertinent past medical history.  Past Surgical History:   Procedure Laterality Date     GYN SURGERY       acetaminophen-codeine (TYLENOL/CODEINE #3) 300-30 MG per tablet  docusate sodium (COLACE) 100 MG tablet  neomycin-polymyxin-hydrocortisone (CORTISPORIN) 3.5-41309-6 otic suspension  sucralfate (CARAFATE) 1 GM tablet      No Known Allergies  Family History  No family history on file.  Social History   Social History     Tobacco Use     Smoking status: Never Smoker     Smokeless tobacco: Never Used   Substance Use Topics     Alcohol use: No      Drug use: No      Past medical history, past surgical history, medications, allergies, family history, and social history were reviewed with the patient. No additional pertinent items.       Review of Systems   Constitutional: Negative for chills and fever.   HENT: Negative for rhinorrhea and sore throat.    Respiratory: Negative for cough and shortness of breath.    Cardiovascular: Negative for chest pain.   Gastrointestinal: Negative for abdominal pain, diarrhea, nausea and vomiting.   Genitourinary: Positive for vaginal bleeding.   Musculoskeletal: Negative for back pain.   Neurological: Positive for dizziness and light-headedness.   All other systems reviewed and are negative.    A complete review of systems was performed with pertinent positives and negatives noted in the HPI, and all other systems negative.    Physical Exam   BP: 104/71  Pulse: 80  Temp: 98.4  F (36.9  C)  Resp: 16  SpO2: 100 %  Physical Exam  Vitals and nursing note reviewed.   Constitutional:       General: She is not in acute distress.     Appearance: She is not diaphoretic.      Comments: Alert, cooperative, no acute distress   HENT:      Head: Atraumatic.      Mouth/Throat:      Mouth: Mucous membranes are moist.      Pharynx: Oropharynx is clear. No oropharyngeal exudate.   Eyes:      General: No scleral icterus.     Pupils: Pupils are equal, round, and reactive to light.   Cardiovascular:      Rate and Rhythm: Normal rate.      Heart sounds: Normal heart sounds.   Pulmonary:      Effort: No respiratory distress.      Breath sounds: Normal breath sounds.   Abdominal:      General: Bowel sounds are normal.      Palpations: Abdomen is soft.      Tenderness: There is no abdominal tenderness.   Genitourinary:     Comments: Pelvic exam: Scant amount of vaginal bleeding noted in vault.  Os is very minimally open.  No evidence of POC, no sign of clots seen.  Musculoskeletal:         General: No tenderness.   Skin:     General: Skin is warm.       Findings: No rash.         ED Course      Procedures       The medical record was reviewed and interpreted.  Current labs reviewed and interpreted.       Results for orders placed or performed during the hospital encounter of 10/02/21   US OB < 14 Weeks Single     Status: None    Narrative    EXAM: US OB < 14 WEEKS SINGLE  LOCATION: [facility]  DATE/TIME: 10/2/2021 10:54 PM    INDICATION: 9 weeks pregnant with vaginal bleeding.  COMPARISON: None.  TECHNIQUE: Transabdominal scans were performed.    FINDINGS:  UTERUS: Single normal appearing intrauterine gestation sac.  CRL: Measures 2.1 cm, equals 8 weeks 6 days.  FETAL HEART RATE: 176 bpm.  AMNIOTIC FLUID: Normal.  PLACENTA: 2 small subchorionic hemorrhages. Hemorrhage at the right of the gestational sac measures 1.3 x 0.7 x 1.4 cm. Hemorrhage left of the gestational sac measures 1.9 x 1.7 x 1.0 cm.    RIGHT OVARY: Normal.  LEFT OVARY: Normal.      Impression    IMPRESSION:   1.  Single living intrauterine gestation at 8 weeks 6 days, EDC 5/8/2022.  2.  Two small subchorionic hemorrhages.       HCG quantitative pregnancy (blood)     Status: Abnormal   Result Value Ref Range    hCG Quantitative 139,072 (H) 0 - 5 IU/L   Comprehensive metabolic panel     Status: Normal   Result Value Ref Range    Sodium 136 133 - 144 mmol/L    Potassium 3.5 3.4 - 5.3 mmol/L    Chloride 107 94 - 109 mmol/L    Carbon Dioxide (CO2) 24 20 - 32 mmol/L    Anion Gap 5 3 - 14 mmol/L    Urea Nitrogen 21 7 - 30 mg/dL    Creatinine 0.52 0.52 - 1.04 mg/dL    Calcium 8.8 8.5 - 10.1 mg/dL    Glucose 81 70 - 99 mg/dL    Alkaline Phosphatase 52 40 - 150 U/L    AST 15 0 - 45 U/L    ALT 22 0 - 50 U/L    Protein Total 7.7 6.8 - 8.8 g/dL    Albumin 3.6 3.4 - 5.0 g/dL    Bilirubin Total 0.3 0.2 - 1.3 mg/dL    GFR Estimate >90 >60 mL/min/1.73m2   CBC with platelets and differential     Status: None   Result Value Ref Range    WBC Count 7.3 4.0 - 11.0 10e3/uL     RBC Count 3.89 3.80 - 5.20 10e6/uL    Hemoglobin 11.8 11.7 - 15.7 g/dL    Hematocrit 35.8 35.0 - 47.0 %    MCV 92 78 - 100 fL    MCH 30.3 26.5 - 33.0 pg    MCHC 33.0 31.5 - 36.5 g/dL    RDW 12.5 10.0 - 15.0 %    Platelet Count 212 150 - 450 10e3/uL    % Neutrophils 52 %    % Lymphocytes 40 %    % Monocytes 7 %    % Eosinophils 1 %    % Basophils 0 %    % Immature Granulocytes 0 %    NRBCs per 100 WBC 0 <1 /100    Absolute Neutrophils 3.8 1.6 - 8.3 10e3/uL    Absolute Lymphocytes 2.9 0.8 - 5.3 10e3/uL    Absolute Monocytes 0.5 0.0 - 1.3 10e3/uL    Absolute Eosinophils 0.1 0.0 - 0.7 10e3/uL    Absolute Basophils 0.0 0.0 - 0.2 10e3/uL    Absolute Immature Granulocytes 0.0 <=0.0 10e3/uL    Absolute NRBCs 0.0 10e3/uL   Extra Blue Top Tube     Status: None   Result Value Ref Range    Hold Specimen [facility]    Extra Red Top Tube     Status: None   Result Value Ref Range    Hold Specimen [facility]    Adult Type and Screen     Status: None   Result Value Ref Range    ABO/RH(D) O POS     Antibody Screen Negative Negative    SPECIMEN EXPIRATION DATE [id number]    CBC with platelets differential     Status: None    Narrative    The following orders were created for panel order CBC with platelets differential.  Procedure                               Abnormality         Status                     ---------                               -----------         ------                     CBC with platelets and d...[[id number]]                      Final result                 Please view results for these tests on the individual orders.   Morris Draw     Status: None    Narrative    The following orders were created for panel order [place] Draw.  Procedure                               Abnormality         Status                     ---------                               -----------         ------                     Extra Blue Top Tube[[id number]]                              Final result               Extra Red Top Tube[[id number]]                                Final result                 Please view results for these tests on the individual orders.   ABO/Rh type and screen     Status: None    Narrative    The following orders were created for panel order ABO/Rh type and screen.  Procedure                               Abnormality         Status                     ---------                               -----------         ------                     Adult Type and Screen[[id number]]                            Final result                 Please view results for these tests on the individual orders.       Medications - No data to display     Assessments & Plan (with Medical Decision Making)   Patient presents to the emergency department today for vaginal bleeding in the context of early pregnancy.  Differential diagnosis could include subchorionic hemorrhage, spontaneous AB, cervical bleeding, much less likely to be ectopic pregnancy as she has already had an ultrasound at [place] this pregnancy which documented an IUP.    We did establish IV access me to draw blood for laboratory analysis.  CBC shows a white count of 7.3, hemoglobin of 11.8, platelets of 212.  CMP appears to be within normal limits.  Quantitative hCG is 139,072.  We did do a pelvic ultrasound which demonstrates IUP with fetal cardiac activity dating 8 weeks 6 days. There is a small subchorionic hemorrhage. This would explain her bleeding.     Patient has not had any further significant bleeding at this time. She was reassured.  Patient needs to follow-up with her OB clinic. Advised nothing per vagina, including intercourse, until the bleeding is completely gone. Per her request, this was specified in her discharge instructions. Return for worsening bleeding. Patient verbalizes understanding.     I have reviewed the nursing notes. I have reviewed the findings, diagnosis, plan and need for follow up with the patient.    Discharge Medication List as of 10/2/2021 11:58 PM          Final  diagnoses:   Subchorionic hemorrhage of placenta in first trimester, single or unspecified fetus       --  I, Connie Napier, am serving as a trained medical scribe to document services personally performed by Ebonie Shultz MD, based on the provider's statements to me.     Ebonie CACERES MD, was physically present and have reviewed and verified the accuracy of this note documented by Connie Napier.    Ebonie Shultz MD  [facility] EMERGENCY DEPARTMENT  10/2/2021     Ebonie Shultz MD  10/03/21 0011

## 2022-11-05 ENCOUNTER — HOSPITAL ENCOUNTER (EMERGENCY)
Facility: CLINIC | Age: 37
Discharge: HOME | End: 2022-11-05
Attending: EMERGENCY MEDICINE | Admitting: EMERGENCY MEDICINE
Payer: COMMERCIAL

## 2022-11-05 VITALS
HEART RATE: 78 BPM | WEIGHT: 185 LBS | OXYGEN SATURATION: 98 % | DIASTOLIC BLOOD PRESSURE: 77 MMHG | TEMPERATURE: 98.1 F | BODY MASS INDEX: 33.84 KG/M2 | SYSTOLIC BLOOD PRESSURE: 117 MMHG | RESPIRATION RATE: 16 BRPM

## 2022-11-05 DIAGNOSIS — S63.621A SPRAIN OF INTERPHALANGEAL JOINT OF RIGHT THUMB, INITIAL ENCOUNTER: ICD-10-CM

## 2022-11-05 DIAGNOSIS — N61.0 MASTITIS: ICD-10-CM

## 2022-11-05 PROCEDURE — 29125 APPL SHORT ARM SPLINT STATIC: CPT | Mod: RT | Performed by: EMERGENCY MEDICINE

## 2022-11-05 PROCEDURE — 99284 EMERGENCY DEPT VISIT MOD MDM: CPT | Performed by: EMERGENCY MEDICINE

## 2022-11-05 RX ORDER — CEPHALEXIN 500 MG/1
500 CAPSULE ORAL 2 TIMES DAILY
Qty: 14 CAPSULE | Refills: 0 | Status: SHIPPED | OUTPATIENT
Start: 2022-11-05 | End: 2022-11-12

## 2022-11-05 ASSESSMENT — ACTIVITIES OF DAILY LIVING (ADL): ADLS_ACUITY_SCORE: 33

## 2022-11-05 NOTE — ED PROVIDER NOTES
[facility] EMERGENCY DEPARTMENT ([place] [facility])     November 5, 2022      History     Chief Complaint   Patient presents with     Breast Pain     She is breast feeding; has pain, swelling, tenderness to right breast; also reports a blister around her right nipple that popped; denies fevers     HPI  Keira Montoya is a 36 year old female with a past medical history significant for GERD who presents to the Emergency Department for evaluation of breast pain. Patient states she has a five month and currently breastfeeding. Patient states her right breast was swollen and painful when she woke up at 5 am this morning. She states she  and pumped but the pain got worse. She states pain gets worse with clothing. She reports she had blister around her right nipple that popped last night. Patient also report hand and thumb pain. She denies trauma. She denies any discharge or fever.     Past Medical History  No past medical history on file.  Past Surgical History:   Procedure Laterality Date     GYN SURGERY       cephALEXin (KEFLEX) 500 MG capsule  acetaminophen-codeine (TYLENOL/CODEINE #3) 300-30 MG per tablet  docusate sodium (COLACE) 100 MG tablet  neomycin-polymyxin-hydrocortisone (CORTISPORIN) 3.5-46199-9 otic suspension  sucralfate (CARAFATE) 1 GM tablet      No Known Allergies  Family History  No family history on file.  Social History   Social History     Tobacco Use     Smoking status: Never     Smokeless tobacco: Never   Substance Use Topics     Alcohol use: No     Drug use: No      Past medical history, past surgical history, medications, allergies, family history, and social history were reviewed with the patient. No additional pertinent items.       Review of Systems  A complete review of systems was performed with pertinent positives and negatives noted in the HPI, and all other systems negative.    Physical Exam   BP: 117/77  Pulse: 78  Temp: 98.1  F (36.7  C)  Resp: 16  Weight: 83.9 kg (185  lb)  SpO2: 98 %  Physical Exam  General: patient is alert and oriented and in no acute distress   Head: atraumatic and normocephalic   EENT: moist mucus membranes, sclera anicteric   Neck: supple   Cardiovascular: regular rate and rhythm, extremities warm and well perfused, no lower extremity edema  Pulmonary: lungs clear to auscultation bilaterally   Abdomen: soft, non-tender   Musculoskeletal: normal range of motion of the right thumb without any erythema, swelling or tenderness to palpation  Neurological: alert and oriented, moving all extremities symmetrically, gait normal   Skin: warm, dry, slight warmth around the right medial breast without induration or fluctuance, no bloody nipple discharge, no open sores or lesions noted    ED Course      Procedures        2:57 PM  The patient was seen and examined by Tiffanie Knight MD in Room ED03.                 No results found for any visits on 11/05/22.  Medications - No data to display     Assessments & Plan (with Medical Decision Making)   Ms. Montoya is a 36 year old female with a past medical history significant for GERD who presents to the Emergency Department for evaluation of breast pain.  She is hemodynamically stable, afebrile and in no respiratory distress.  History and exam are most consistent with mastitis.  I do not appreciate any abscess.  She does not have severe cellulitis or necrotizing infection.  She was given a prescription for Keflex and instructed to continue pumping regularly and use warm compresses.  Additionally she reports some discomfort of her right thumb when moving it.  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  Suspect sprain.  No evidence of infectious tenosynovitis and do not suspect an occult fracture.  She was given a thumb splint.  She will follow-up with primary care for further evaluation.  She was given close return precautions and voiced understanding.    I have reviewed the  nursing notes. I have reviewed the findings, diagnosis, plan and need for follow up with the patient.    Discharge Medication List as of 11/5/2022  3:12 PM      START taking these medications    Details   cephALEXin (KEFLEX) 500 MG capsule Take 1 capsule (500 mg) by mouth 2 times daily for 7 days, Disp-14 capsule, R-0, Local Print             Final diagnoses:   Mastitis   Sprain of interphalangeal joint of right thumb, initial encounter   I, Mónica Rubi, am serving as a trained medical scribe to document services personally performed by Tiffanie Knight MD, based on the provider's statements to me.      I, Tiffanie Knight MD, was physically present and have reviewed and verified the accuracy of this note documented by Mónica Rubi.     --  Tiffanie Knight MD  [facility] EMERGENCY DEPARTMENT  11/5/2022     Tiffanie Knight MD  11/05/22 [id number]

## 2022-11-05 NOTE — DISCHARGE INSTRUCTIONS
Please make an appointment to follow up with Your Primary Care Provider in 7-10 days as needed.    Take Keflex as prescribed for mastitis.  Continue to pump regularly and use warm compresses to help with discomfort.  If you have worsening symptoms including increased pain, swelling or redness of the breast, fevers or other concerns return to the emergency department for reevaluation.    You may use thumb splint to help with discomfort.  Follow-up with your primary care provider for further evaluation if symptoms persist.  If you notice any redness, inability to move the thumb or other worsening symptoms return to the emergency department.

## 2022-11-05 NOTE — ED TRIAGE NOTES
Triage Assessment     Row Name 11/05/22 1446       Triage Assessment (Adult)    Airway WDL WDL       Respiratory WDL    Respiratory WDL WDL       Skin Circulation/Temperature WDL    Skin Circulation/Temperature WDL X  sore to right breast       Cardiac WDL    Cardiac WDL WDL       Peripheral/Neurovascular WDL    Peripheral Neurovascular WDL WDL       Cognitive/Neuro/Behavioral WDL    Cognitive/Neuro/Behavioral WDL WDL